# Patient Record
Sex: FEMALE | Race: WHITE | NOT HISPANIC OR LATINO | Employment: FULL TIME | URBAN - METROPOLITAN AREA
[De-identification: names, ages, dates, MRNs, and addresses within clinical notes are randomized per-mention and may not be internally consistent; named-entity substitution may affect disease eponyms.]

---

## 2023-04-03 DIAGNOSIS — N94.6 DYSMENORRHEA: ICD-10-CM

## 2023-04-03 DIAGNOSIS — F41.9 ANXIETY: Primary | ICD-10-CM

## 2023-04-03 RX ORDER — ESCITALOPRAM OXALATE 10 MG/1
10 TABLET ORAL DAILY
Qty: 90 TABLET | Refills: 1 | Status: SHIPPED | OUTPATIENT
Start: 2023-04-03 | End: 2023-04-19 | Stop reason: SDUPTHER

## 2023-04-03 RX ORDER — DROSPIRENONE AND ETHINYL ESTRADIOL 0.02-3(28)
1 KIT ORAL DAILY
COMMUNITY
End: 2023-04-03 | Stop reason: SDUPTHER

## 2023-04-03 RX ORDER — ESCITALOPRAM OXALATE 10 MG/1
10 TABLET ORAL DAILY
COMMUNITY
End: 2023-04-03 | Stop reason: SDUPTHER

## 2023-04-03 RX ORDER — DROSPIRENONE AND ETHINYL ESTRADIOL 0.02-3(28)
1 KIT ORAL DAILY
Qty: 84 TABLET | Refills: 1 | Status: SHIPPED | OUTPATIENT
Start: 2023-04-03 | End: 2023-09-08 | Stop reason: SDUPTHER

## 2023-04-19 ENCOUNTER — OFFICE VISIT (OUTPATIENT)
Dept: PRIMARY CARE | Facility: CLINIC | Age: 33
End: 2023-04-19
Payer: COMMERCIAL

## 2023-04-19 VITALS
TEMPERATURE: 97.8 F | RESPIRATION RATE: 16 BRPM | HEART RATE: 78 BPM | DIASTOLIC BLOOD PRESSURE: 72 MMHG | WEIGHT: 235 LBS | OXYGEN SATURATION: 97 % | BODY MASS INDEX: 41.63 KG/M2 | SYSTOLIC BLOOD PRESSURE: 116 MMHG

## 2023-04-19 DIAGNOSIS — R73.03 PRE-DIABETES: ICD-10-CM

## 2023-04-19 DIAGNOSIS — G47.00 INSOMNIA, UNSPECIFIED TYPE: ICD-10-CM

## 2023-04-19 DIAGNOSIS — E03.9 HYPOTHYROIDISM, UNSPECIFIED TYPE: Primary | ICD-10-CM

## 2023-04-19 DIAGNOSIS — F41.9 ANXIETY: ICD-10-CM

## 2023-04-19 DIAGNOSIS — E28.2 PCOS (POLYCYSTIC OVARIAN SYNDROME): ICD-10-CM

## 2023-04-19 PROBLEM — R56.9 OBSERVED SEIZURE-LIKE ACTIVITY (MULTI): Status: ACTIVE | Noted: 2023-04-19

## 2023-04-19 PROCEDURE — 99214 OFFICE O/P EST MOD 30 MIN: CPT | Performed by: FAMILY MEDICINE

## 2023-04-19 PROCEDURE — 1036F TOBACCO NON-USER: CPT | Performed by: FAMILY MEDICINE

## 2023-04-19 RX ORDER — TRAZODONE HYDROCHLORIDE 50 MG/1
50 TABLET ORAL NIGHTLY PRN
Qty: 30 TABLET | Refills: 3 | Status: SHIPPED | OUTPATIENT
Start: 2023-04-19 | End: 2023-11-13 | Stop reason: SDUPTHER

## 2023-04-19 RX ORDER — METFORMIN HYDROCHLORIDE 500 MG/1
1 TABLET ORAL DAILY
COMMUNITY
Start: 2022-02-01 | End: 2023-09-08 | Stop reason: SDUPTHER

## 2023-04-19 RX ORDER — ESCITALOPRAM OXALATE 10 MG/1
10 TABLET ORAL DAILY
Qty: 90 TABLET | Refills: 1 | Status: SHIPPED | OUTPATIENT
Start: 2023-04-19 | End: 2023-07-12 | Stop reason: SDUPTHER

## 2023-04-19 RX ORDER — LEVOTHYROXINE SODIUM 50 UG/1
1 TABLET ORAL DAILY
COMMUNITY
Start: 2022-12-04 | End: 2023-04-25 | Stop reason: SDUPTHER

## 2023-04-19 ASSESSMENT — ENCOUNTER SYMPTOMS
CONSTIPATION: 0
WEIGHT GAIN: 0
WEIGHT LOSS: 0
NERVOUS/ANXIOUS: 1
FATIGUE: 1

## 2023-04-19 NOTE — PATIENT INSTRUCTIONS
Today we followed up on your Thyroid medication - we will check your labs and adjust the medication accordingly.  Our goal will be to have a TSH between 2 and 3.  We will closely monitor your symptoms to determine the optimal dosing.     We also addressed your insomnia and we will add trazadone to your treatment and continue lexapro for anxiety    For your pre diabetes continue your meeting with dietician and I have rechecked your a1c. Continue metformin.

## 2023-04-24 ENCOUNTER — LAB (OUTPATIENT)
Dept: LAB | Facility: LAB | Age: 33
End: 2023-04-24
Payer: COMMERCIAL

## 2023-04-24 DIAGNOSIS — R73.03 PRE-DIABETES: ICD-10-CM

## 2023-04-24 DIAGNOSIS — E03.9 HYPOTHYROIDISM, UNSPECIFIED TYPE: ICD-10-CM

## 2023-04-24 LAB
ESTIMATED AVERAGE GLUCOSE FOR HBA1C: 126 MG/DL
HEMOGLOBIN A1C/HEMOGLOBIN TOTAL IN BLOOD: 6 %
THYROTROPIN (MIU/L) IN SER/PLAS BY DETECTION LIMIT <= 0.05 MIU/L: 3.64 MIU/L (ref 0.44–3.98)
THYROXINE (T4) FREE (NG/DL) IN SER/PLAS: 0.83 NG/DL (ref 0.61–1.12)

## 2023-04-24 PROCEDURE — 84443 ASSAY THYROID STIM HORMONE: CPT

## 2023-04-24 PROCEDURE — 36415 COLL VENOUS BLD VENIPUNCTURE: CPT

## 2023-04-24 PROCEDURE — 83036 HEMOGLOBIN GLYCOSYLATED A1C: CPT

## 2023-04-24 PROCEDURE — 84439 ASSAY OF FREE THYROXINE: CPT

## 2023-04-25 DIAGNOSIS — E03.9 HYPOTHYROIDISM, UNSPECIFIED TYPE: Primary | ICD-10-CM

## 2023-04-25 RX ORDER — LEVOTHYROXINE SODIUM 50 UG/1
50 TABLET ORAL DAILY
Qty: 30 TABLET | Refills: 2 | Status: SHIPPED | OUTPATIENT
Start: 2023-04-25 | End: 2023-08-04

## 2023-07-12 ENCOUNTER — OFFICE VISIT (OUTPATIENT)
Dept: PRIMARY CARE | Facility: CLINIC | Age: 33
End: 2023-07-12
Payer: COMMERCIAL

## 2023-07-12 VITALS
WEIGHT: 241 LBS | OXYGEN SATURATION: 98 % | RESPIRATION RATE: 16 BRPM | TEMPERATURE: 97.7 F | SYSTOLIC BLOOD PRESSURE: 122 MMHG | HEART RATE: 102 BPM | BODY MASS INDEX: 42.7 KG/M2 | HEIGHT: 63 IN | DIASTOLIC BLOOD PRESSURE: 82 MMHG

## 2023-07-12 DIAGNOSIS — E03.9 HYPOTHYROIDISM, UNSPECIFIED TYPE: ICD-10-CM

## 2023-07-12 DIAGNOSIS — R73.03 PRE-DIABETES: Primary | ICD-10-CM

## 2023-07-12 DIAGNOSIS — F41.9 ANXIETY: ICD-10-CM

## 2023-07-12 DIAGNOSIS — Z23 NEED FOR VACCINATION: ICD-10-CM

## 2023-07-12 DIAGNOSIS — Z00.00 HEALTHCARE MAINTENANCE: ICD-10-CM

## 2023-07-12 DIAGNOSIS — R59.0 LYMPHADENOPATHY, CERVICAL: ICD-10-CM

## 2023-07-12 DIAGNOSIS — F41.9 ANXIETY DISORDER, UNSPECIFIED TYPE: ICD-10-CM

## 2023-07-12 PROBLEM — E28.2 PCOS (POLYCYSTIC OVARIAN SYNDROME): Status: ACTIVE | Noted: 2023-07-12

## 2023-07-12 LAB
POC APPEARANCE, URINE: CLEAR
POC BILIRUBIN, URINE: NEGATIVE
POC BLOOD, URINE: ABNORMAL
POC COLOR, URINE: YELLOW
POC GLUCOSE, URINE: NEGATIVE MG/DL
POC KETONES, URINE: NEGATIVE MG/DL
POC LEUKOCYTES, URINE: NEGATIVE
POC NITRITE,URINE: NEGATIVE
POC PH, URINE: 6 PH
POC PROTEIN, URINE: NEGATIVE MG/DL
POC SPECIFIC GRAVITY, URINE: 1.02
POC UROBILINOGEN, URINE: 0.2 EU/DL

## 2023-07-12 PROCEDURE — 99213 OFFICE O/P EST LOW 20 MIN: CPT | Performed by: FAMILY MEDICINE

## 2023-07-12 PROCEDURE — 1036F TOBACCO NON-USER: CPT | Performed by: FAMILY MEDICINE

## 2023-07-12 PROCEDURE — 81002 URINALYSIS NONAUTO W/O SCOPE: CPT | Performed by: FAMILY MEDICINE

## 2023-07-12 PROCEDURE — 99395 PREV VISIT EST AGE 18-39: CPT | Performed by: FAMILY MEDICINE

## 2023-07-12 PROCEDURE — 82043 UR ALBUMIN QUANTITATIVE: CPT

## 2023-07-12 PROCEDURE — 82570 ASSAY OF URINE CREATININE: CPT

## 2023-07-12 RX ORDER — ESCITALOPRAM OXALATE 10 MG/1
10 TABLET ORAL DAILY
Qty: 90 TABLET | Refills: 1 | Status: SHIPPED | OUTPATIENT
Start: 2023-07-12 | End: 2024-03-13 | Stop reason: SDUPTHER

## 2023-07-12 NOTE — PATIENT INSTRUCTIONS
Today we performed your Annual Physical Exam.  Your preventative health care, labs and vaccinations have been reviewed and are up to date.      Please get your Tdap at the pharmacy    If the lymph node in your neck doesn't resolve please let me know in a few weeks I can order an ultrasound    Follow up in 4 months for medication check    Follow up in 1 year for your Complete Physical Exam
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
## 2023-07-13 LAB
ALBUMIN (MG/L) IN URINE: 17.4 MG/L
ALBUMIN/CREATININE (UG/MG) IN URINE: 12.5 UG/MG CRT (ref 0–30)
CREATININE (MG/DL) IN URINE: 139 MG/DL (ref 20–320)

## 2023-08-04 ENCOUNTER — LAB (OUTPATIENT)
Dept: LAB | Facility: LAB | Age: 33
End: 2023-08-04
Payer: COMMERCIAL

## 2023-08-04 DIAGNOSIS — R79.89 ELEVATED LFTS: Primary | ICD-10-CM

## 2023-08-04 DIAGNOSIS — E03.9 HYPOTHYROIDISM, UNSPECIFIED TYPE: ICD-10-CM

## 2023-08-04 DIAGNOSIS — Z00.00 HEALTHCARE MAINTENANCE: ICD-10-CM

## 2023-08-04 DIAGNOSIS — R73.03 PRE-DIABETES: ICD-10-CM

## 2023-08-04 LAB
ALANINE AMINOTRANSFERASE (SGPT) (U/L) IN SER/PLAS: 82 U/L (ref 7–45)
ALBUMIN (G/DL) IN SER/PLAS: 4 G/DL (ref 3.4–5)
ALKALINE PHOSPHATASE (U/L) IN SER/PLAS: 38 U/L (ref 33–110)
ANION GAP IN SER/PLAS: 16 MMOL/L (ref 10–20)
ASPARTATE AMINOTRANSFERASE (SGOT) (U/L) IN SER/PLAS: 249 U/L (ref 9–39)
BILIRUBIN TOTAL (MG/DL) IN SER/PLAS: 0.4 MG/DL (ref 0–1.2)
CALCIDIOL (25 OH VITAMIN D3) (NG/ML) IN SER/PLAS: 32 NG/ML
CALCIUM (MG/DL) IN SER/PLAS: 9 MG/DL (ref 8.6–10.3)
CARBON DIOXIDE, TOTAL (MMOL/L) IN SER/PLAS: 23 MMOL/L (ref 21–32)
CHLORIDE (MMOL/L) IN SER/PLAS: 102 MMOL/L (ref 98–107)
CHOLESTEROL (MG/DL) IN SER/PLAS: 164 MG/DL (ref 0–199)
CHOLESTEROL IN HDL (MG/DL) IN SER/PLAS: 54.5 MG/DL
CHOLESTEROL/HDL RATIO: 3
CREATININE (MG/DL) IN SER/PLAS: 0.97 MG/DL (ref 0.5–1.05)
ERYTHROCYTE DISTRIBUTION WIDTH (RATIO) BY AUTOMATED COUNT: 12.6 % (ref 11.5–14.5)
ERYTHROCYTE MEAN CORPUSCULAR HEMOGLOBIN CONCENTRATION (G/DL) BY AUTOMATED: 33.3 G/DL (ref 32–36)
ERYTHROCYTE MEAN CORPUSCULAR VOLUME (FL) BY AUTOMATED COUNT: 95 FL (ref 80–100)
ERYTHROCYTES (10*6/UL) IN BLOOD BY AUTOMATED COUNT: 4.23 X10E12/L (ref 4–5.2)
ESTIMATED AVERAGE GLUCOSE FOR HBA1C: 126 MG/DL
GFR FEMALE: 80 ML/MIN/1.73M2
GLUCOSE (MG/DL) IN SER/PLAS: 107 MG/DL (ref 74–99)
HEMATOCRIT (%) IN BLOOD BY AUTOMATED COUNT: 40.3 % (ref 36–46)
HEMOGLOBIN (G/DL) IN BLOOD: 13.4 G/DL (ref 12–16)
HEMOGLOBIN A1C/HEMOGLOBIN TOTAL IN BLOOD: 6 %
LDL: 53 MG/DL (ref 0–99)
LEUKOCYTES (10*3/UL) IN BLOOD BY AUTOMATED COUNT: 7.6 X10E9/L (ref 4.4–11.3)
NON HDL CHOLESTEROL: 110 MG/DL
NRBC (PER 100 WBCS) BY AUTOMATED COUNT: 0 /100 WBC (ref 0–0)
PLATELETS (10*3/UL) IN BLOOD AUTOMATED COUNT: 397 X10E9/L (ref 150–450)
POTASSIUM (MMOL/L) IN SER/PLAS: 4.6 MMOL/L (ref 3.5–5.3)
PROTEIN TOTAL: 7 G/DL (ref 6.4–8.2)
SODIUM (MMOL/L) IN SER/PLAS: 136 MMOL/L (ref 136–145)
THYROTROPIN (MIU/L) IN SER/PLAS BY DETECTION LIMIT <= 0.05 MIU/L: 6.09 MIU/L (ref 0.44–3.98)
THYROXINE (T4) FREE (NG/DL) IN SER/PLAS: 0.69 NG/DL (ref 0.61–1.12)
TRIGLYCERIDE (MG/DL) IN SER/PLAS: 285 MG/DL (ref 0–149)
UREA NITROGEN (MG/DL) IN SER/PLAS: 15 MG/DL (ref 6–23)
VLDL: 57 MG/DL (ref 0–40)

## 2023-08-04 PROCEDURE — 85027 COMPLETE CBC AUTOMATED: CPT

## 2023-08-04 PROCEDURE — 82306 VITAMIN D 25 HYDROXY: CPT

## 2023-08-04 PROCEDURE — 80053 COMPREHEN METABOLIC PANEL: CPT

## 2023-08-04 PROCEDURE — 84443 ASSAY THYROID STIM HORMONE: CPT

## 2023-08-04 PROCEDURE — 84439 ASSAY OF FREE THYROXINE: CPT

## 2023-08-04 PROCEDURE — 36415 COLL VENOUS BLD VENIPUNCTURE: CPT

## 2023-08-04 PROCEDURE — 80061 LIPID PANEL: CPT

## 2023-08-04 PROCEDURE — 83036 HEMOGLOBIN GLYCOSYLATED A1C: CPT

## 2023-08-04 RX ORDER — LEVOTHYROXINE SODIUM 75 UG/1
75 TABLET ORAL DAILY
Qty: 30 TABLET | Refills: 2 | Status: SHIPPED | OUTPATIENT
Start: 2023-08-04 | End: 2023-11-13 | Stop reason: SDUPTHER

## 2023-08-25 PROBLEM — R79.89 LOW VITAMIN B12 LEVEL: Status: ACTIVE | Noted: 2023-08-25

## 2023-08-25 PROBLEM — E03.9 HYPOTHYROIDISM: Status: ACTIVE | Noted: 2023-02-20

## 2023-08-25 PROBLEM — Z04.9 CONDITION NOT FOUND: Status: ACTIVE | Noted: 2023-08-25

## 2023-08-25 PROBLEM — K21.9 GERD (GASTROESOPHAGEAL REFLUX DISEASE): Status: ACTIVE | Noted: 2021-10-20

## 2023-08-25 PROBLEM — F43.10 PTSD (POST-TRAUMATIC STRESS DISORDER): Status: ACTIVE | Noted: 2023-08-25

## 2023-08-25 PROBLEM — R10.2 CHRONIC PAIN IN FEMALE PELVIS: Status: ACTIVE | Noted: 2023-08-25

## 2023-08-25 PROBLEM — R63.5 WEIGHT GAIN: Status: ACTIVE | Noted: 2023-08-25

## 2023-08-25 PROBLEM — F41.1 GENERALIZED ANXIETY DISORDER: Status: ACTIVE | Noted: 2023-07-12

## 2023-08-25 PROBLEM — R62.50 DEVELOPMENTAL CONCERN: Status: ACTIVE | Noted: 2023-08-25

## 2023-08-25 PROBLEM — R10.84 GENERALIZED ABDOMINAL PAIN: Status: ACTIVE | Noted: 2023-08-25

## 2023-08-25 PROBLEM — E88.819 INSULIN RESISTANCE: Status: ACTIVE | Noted: 2023-08-25

## 2023-08-25 PROBLEM — N94.6 DYSMENORRHEA: Status: ACTIVE | Noted: 2023-08-25

## 2023-08-25 PROBLEM — G89.29 CHRONIC PAIN IN FEMALE PELVIS: Status: ACTIVE | Noted: 2023-08-25

## 2023-08-25 PROBLEM — E56.9 VITAMIN DEFICIENCY: Status: ACTIVE | Noted: 2023-02-20

## 2023-08-25 PROBLEM — R73.9 HYPERGLYCEMIA: Status: ACTIVE | Noted: 2023-08-25

## 2023-08-25 PROBLEM — N60.19 FIBROCYSTIC BREAST CHANGES: Status: ACTIVE | Noted: 2023-08-25

## 2023-08-25 PROBLEM — F33.9 EPISODE OF RECURRENT MAJOR DEPRESSIVE DISORDER (CMS-HCC): Chronic | Status: ACTIVE | Noted: 2023-02-20

## 2023-08-25 PROBLEM — K58.9 IBS (IRRITABLE BOWEL SYNDROME): Status: ACTIVE | Noted: 2021-10-20

## 2023-08-25 PROBLEM — F84.0 AUTISM (HHS-HCC): Status: ACTIVE | Noted: 2022-03-13

## 2023-08-25 PROBLEM — R79.89 ELEVATED TSH: Status: ACTIVE | Noted: 2023-08-25

## 2023-08-25 PROBLEM — E66.01 MORBID (SEVERE) OBESITY DUE TO EXCESS CALORIES (MULTI): Status: ACTIVE | Noted: 2023-02-20

## 2023-08-25 PROBLEM — G40.909 SEIZURE DISORDER (MULTI): Status: ACTIVE | Noted: 2023-08-25

## 2023-08-25 PROBLEM — K59.09 CHRONIC CONSTIPATION: Status: ACTIVE | Noted: 2023-08-25

## 2023-08-25 PROBLEM — R79.89 ELEVATED LFTS: Status: ACTIVE | Noted: 2023-08-25

## 2023-08-25 PROBLEM — R74.8 ABNORMAL CREATINE KINASE LEVEL: Status: ACTIVE | Noted: 2023-08-25

## 2023-08-25 PROBLEM — R00.0 SINUS TACHYCARDIA: Status: ACTIVE | Noted: 2023-08-25

## 2023-08-25 PROBLEM — R12 HEARTBURN: Status: ACTIVE | Noted: 2023-08-25

## 2023-08-25 PROBLEM — R73.03 PREDIABETES: Status: ACTIVE | Noted: 2023-02-20

## 2023-08-25 PROBLEM — F50.00 ANOREXIA NERVOSA IN REMISSION (MULTI): Status: ACTIVE | Noted: 2023-08-25

## 2023-08-25 PROBLEM — E28.2 PCOS (POLYCYSTIC OVARIAN SYNDROME): Status: ACTIVE | Noted: 2023-02-20

## 2023-08-25 RX ORDER — LEVOTHYROXINE SODIUM 50 UG/1
50 TABLET ORAL DAILY
COMMUNITY
End: 2023-11-13 | Stop reason: ALTCHOICE

## 2023-08-25 RX ORDER — ERGOCALCIFEROL 1.25 MG/1
1.25 CAPSULE ORAL
COMMUNITY
End: 2024-03-13 | Stop reason: ALTCHOICE

## 2023-09-08 DIAGNOSIS — R73.03 PREDIABETES: ICD-10-CM

## 2023-09-08 DIAGNOSIS — N94.6 DYSMENORRHEA: ICD-10-CM

## 2023-09-08 DIAGNOSIS — E28.2 PCOS (POLYCYSTIC OVARIAN SYNDROME): Primary | ICD-10-CM

## 2023-09-08 RX ORDER — DROSPIRENONE AND ETHINYL ESTRADIOL 0.02-3(28)
1 KIT ORAL DAILY
Qty: 84 TABLET | Refills: 0 | Status: SHIPPED | OUTPATIENT
Start: 2023-09-08 | End: 2023-11-13 | Stop reason: SDUPTHER

## 2023-09-08 RX ORDER — METFORMIN HYDROCHLORIDE 500 MG/1
500 TABLET ORAL DAILY
Qty: 90 TABLET | Refills: 0 | Status: SHIPPED | OUTPATIENT
Start: 2023-09-08 | End: 2023-11-13 | Stop reason: SDUPTHER

## 2023-09-08 NOTE — TELEPHONE ENCOUNTER
Refill approved but needs appt for additional refills.  Patient was advised of this 4 months ago.  Please call to schedule

## 2023-10-04 ENCOUNTER — APPOINTMENT (OUTPATIENT)
Dept: NEUROLOGY | Facility: CLINIC | Age: 33
End: 2023-10-04
Payer: COMMERCIAL

## 2023-11-13 ENCOUNTER — OFFICE VISIT (OUTPATIENT)
Dept: PRIMARY CARE | Facility: CLINIC | Age: 33
End: 2023-11-13
Payer: COMMERCIAL

## 2023-11-13 VITALS
TEMPERATURE: 98.1 F | RESPIRATION RATE: 18 BRPM | BODY MASS INDEX: 42.88 KG/M2 | OXYGEN SATURATION: 100 % | HEIGHT: 63 IN | SYSTOLIC BLOOD PRESSURE: 134 MMHG | WEIGHT: 242 LBS | DIASTOLIC BLOOD PRESSURE: 78 MMHG | HEART RATE: 75 BPM

## 2023-11-13 DIAGNOSIS — E03.9 HYPOTHYROIDISM, UNSPECIFIED TYPE: ICD-10-CM

## 2023-11-13 DIAGNOSIS — R73.03 PREDIABETES: ICD-10-CM

## 2023-11-13 DIAGNOSIS — G47.00 INSOMNIA, UNSPECIFIED TYPE: ICD-10-CM

## 2023-11-13 DIAGNOSIS — N94.6 DYSMENORRHEA: ICD-10-CM

## 2023-11-13 DIAGNOSIS — E88.819 INSULIN RESISTANCE: ICD-10-CM

## 2023-11-13 DIAGNOSIS — E28.2 PCOS (POLYCYSTIC OVARIAN SYNDROME): ICD-10-CM

## 2023-11-13 DIAGNOSIS — R73.03 PRE-DIABETES: Primary | ICD-10-CM

## 2023-11-13 LAB — POC HEMOGLOBIN A1C: 5.7 % (ref 4.2–6.5)

## 2023-11-13 PROCEDURE — 1036F TOBACCO NON-USER: CPT | Performed by: FAMILY MEDICINE

## 2023-11-13 PROCEDURE — 83036 HEMOGLOBIN GLYCOSYLATED A1C: CPT | Performed by: FAMILY MEDICINE

## 2023-11-13 PROCEDURE — 99214 OFFICE O/P EST MOD 30 MIN: CPT | Performed by: FAMILY MEDICINE

## 2023-11-13 RX ORDER — DROSPIRENONE AND ETHINYL ESTRADIOL 0.02-3(28)
1 KIT ORAL DAILY
Qty: 84 TABLET | Refills: 3 | Status: SHIPPED | OUTPATIENT
Start: 2023-11-13

## 2023-11-13 RX ORDER — METFORMIN HYDROCHLORIDE 500 MG/1
500 TABLET ORAL DAILY
Qty: 90 TABLET | Refills: 0 | Status: SHIPPED | OUTPATIENT
Start: 2023-11-13 | End: 2024-03-13 | Stop reason: SINTOL

## 2023-11-13 RX ORDER — TRAZODONE HYDROCHLORIDE 50 MG/1
50 TABLET ORAL NIGHTLY PRN
Qty: 90 TABLET | Refills: 1 | Status: SHIPPED | OUTPATIENT
Start: 2023-11-13 | End: 2024-03-13 | Stop reason: SDUPTHER

## 2023-11-13 RX ORDER — LEVOTHYROXINE SODIUM 75 UG/1
75 TABLET ORAL DAILY
Qty: 30 TABLET | Refills: 0 | Status: SHIPPED | OUTPATIENT
Start: 2023-11-13 | End: 2023-11-17 | Stop reason: ALTCHOICE

## 2023-11-13 NOTE — PROGRESS NOTES
"Subjective   Patient ID: Levi Ramírez is a 33 y.o. female who presents for Diabetes.    She has met with a dietician every few months.  She is on metformin.  The nutritionist is leaving her position.  She does what she can between her work schedule and expensive groceries.  She tries to eat veggies every day and tries to keep hydrated.  She walks a lot for work and averages between 3K and 4K steps a day.  She was working out regularly for awhile and lost only 10 pounds.    She is still on her thyroid medication but she can't do labs today because she just took it.  She lives in [facility] and needs to do her labs there.     She hasn't had any more seizure like activity and thinks that she had night terrors.      Diabetes         Review of Systems    Objective   /78   Pulse 75   Temp 36.7 °C (98.1 °F)   Resp 18   Ht 1.6 m (5' 3\")   Wt 110 kg (242 lb)   SpO2 100%   BMI 42.87 kg/m²     Physical Exam  Vitals reviewed.   Constitutional:       Appearance: Normal appearance.   HENT:      Head: Normocephalic and atraumatic.      Right Ear: Tympanic membrane normal.      Left Ear: Tympanic membrane normal.      Mouth/Throat:      Mouth: Mucous membranes are moist.   Eyes:      Pupils: Pupils are equal, round, and reactive to light.   Cardiovascular:      Rate and Rhythm: Normal rate and regular rhythm.      Pulses:           Dorsalis pedis pulses are 2+ on the right side and 2+ on the left side.      Heart sounds: Normal heart sounds.   Pulmonary:      Effort: Pulmonary effort is normal.      Breath sounds: Normal breath sounds.   Musculoskeletal:      Cervical back: Normal range of motion.   Feet:      Right foot:      Skin integrity: Skin integrity normal.      Toenail Condition: Right toenails are normal.      Left foot:      Skin integrity: Skin integrity normal.      Toenail Condition: Left toenails are normal.   Skin:     General: Skin is dry.   Neurological:      General: No focal deficit present.      Mental " Status: She is alert.         Assessment/Plan   Diagnoses and all orders for this visit:  Pre-diabetes  -     POCT glycosylated hemoglobin (Hb A1C) manually resulted  Prediabetes  -     metFORMIN (Glucophage) 500 mg tablet; Take 1 tablet (500 mg) by mouth once daily.  Insulin resistance  Dysmenorrhea  -     drospirenone-ethinyl estradioL (Michelle, Gianvi) 3-0.02 mg tablet; Take 1 tablet by mouth once daily.  Hypothyroidism, unspecified type  -     Thyroid Stimulating Hormone; Future  -     Thyroxine, Free; Future  -     levothyroxine (Synthroid, Levoxyl) 75 mcg tablet; Take 1 tablet (75 mcg) by mouth once daily.  Insomnia, unspecified type  -     traZODone (Desyrel) 50 mg tablet; Take 1 tablet (50 mg) by mouth as needed at bedtime for sleep (start 1/2 pill at bedtime and may increase to 1 pill at bedtime if needed.).  PCOS (polycystic ovarian syndrome)  -     metFORMIN (Glucophage) 500 mg tablet; Take 1 tablet (500 mg) by mouth once daily.

## 2023-11-13 NOTE — PATIENT INSTRUCTIONS
Today we followed up on your Diabetes.     You are doing great!  Continue 5 servings of fresh fruits and veggies daily.  8 glasses of water daily.  30 minutes of exercise daily.  Continue your current medications.     Follow up in 4 months for your medication check     Because you are having difficulty losing weight and have insulin resistance you may qualify for GLP-1 so you will check with your insurance to see what is covered and let me know.      Today we followed up on your Thyroid medication - we will check your labs and adjust the medication accordingly.  Our goal will be to have a TSH between 2 and 3.  We will closely monitor your symptoms to determine the optimal dosing.     Your sleep is doing well with trazadone and so we refille this today as well.

## 2023-11-16 ENCOUNTER — LAB (OUTPATIENT)
Dept: LAB | Facility: LAB | Age: 33
End: 2023-11-16
Payer: COMMERCIAL

## 2023-11-16 DIAGNOSIS — E03.9 HYPOTHYROIDISM, UNSPECIFIED TYPE: ICD-10-CM

## 2023-11-16 DIAGNOSIS — R79.89 ELEVATED LFTS: ICD-10-CM

## 2023-11-16 LAB
ALBUMIN SERPL BCP-MCNC: 3.7 G/DL (ref 3.4–5)
ALP SERPL-CCNC: 43 U/L (ref 33–110)
ALT SERPL W P-5'-P-CCNC: 42 U/L (ref 7–45)
AST SERPL W P-5'-P-CCNC: 43 U/L (ref 9–39)
BILIRUB DIRECT SERPL-MCNC: 0.1 MG/DL (ref 0–0.3)
BILIRUB SERPL-MCNC: 0.3 MG/DL (ref 0–1.2)
EBV EA IGG SER QL: POSITIVE
EBV NA AB SER QL: POSITIVE
EBV VCA IGG SER IA-ACNC: POSITIVE
EBV VCA IGM SER IA-ACNC: ABNORMAL
GGT SERPL-CCNC: 71 U/L (ref 5–55)
HAV IGM SER QL: NONREACTIVE
HBV CORE IGM SER QL: NONREACTIVE
HBV SURFACE AG SERPL QL IA: NONREACTIVE
HCV AB SER QL: NONREACTIVE
PROT SERPL-MCNC: 6.7 G/DL (ref 6.4–8.2)
T4 FREE SERPL-MCNC: 0.78 NG/DL (ref 0.61–1.12)
TSH SERPL-ACNC: 5.75 MIU/L (ref 0.44–3.98)

## 2023-11-16 PROCEDURE — 36415 COLL VENOUS BLD VENIPUNCTURE: CPT

## 2023-11-16 PROCEDURE — 86663 EPSTEIN-BARR ANTIBODY: CPT

## 2023-11-16 PROCEDURE — 82977 ASSAY OF GGT: CPT

## 2023-11-16 PROCEDURE — 84443 ASSAY THYROID STIM HORMONE: CPT

## 2023-11-16 PROCEDURE — 86664 EPSTEIN-BARR NUCLEAR ANTIGEN: CPT

## 2023-11-16 PROCEDURE — 84439 ASSAY OF FREE THYROXINE: CPT

## 2023-11-16 PROCEDURE — 89240 UNLISTED MISC PATH TEST: CPT | Performed by: FAMILY MEDICINE

## 2023-11-16 PROCEDURE — 86665 EPSTEIN-BARR CAPSID VCA: CPT

## 2023-11-16 PROCEDURE — 80074 ACUTE HEPATITIS PANEL: CPT

## 2023-11-16 PROCEDURE — 80076 HEPATIC FUNCTION PANEL: CPT

## 2023-11-17 DIAGNOSIS — R79.89 ELEVATED LFTS: Primary | ICD-10-CM

## 2023-11-17 DIAGNOSIS — E03.9 HYPOTHYROIDISM, UNSPECIFIED TYPE: Primary | ICD-10-CM

## 2023-11-17 RX ORDER — LEVOTHYROXINE SODIUM 88 UG/1
88 TABLET ORAL DAILY
Qty: 30 TABLET | Refills: 3 | Status: SHIPPED | OUTPATIENT
Start: 2023-11-17 | End: 2024-03-13 | Stop reason: DRUGHIGH

## 2023-11-20 LAB — SCAN RESULT: NORMAL

## 2024-01-08 ENCOUNTER — APPOINTMENT (OUTPATIENT)
Dept: NUTRITION | Facility: HOSPITAL | Age: 34
End: 2024-01-08
Payer: COMMERCIAL

## 2024-03-13 ENCOUNTER — TELEPHONE (OUTPATIENT)
Dept: PRIMARY CARE | Facility: CLINIC | Age: 34
End: 2024-03-13

## 2024-03-13 ENCOUNTER — LAB (OUTPATIENT)
Dept: LAB | Facility: LAB | Age: 34
End: 2024-03-13
Payer: COMMERCIAL

## 2024-03-13 ENCOUNTER — OFFICE VISIT (OUTPATIENT)
Dept: PRIMARY CARE | Facility: CLINIC | Age: 34
End: 2024-03-13
Payer: COMMERCIAL

## 2024-03-13 VITALS
OXYGEN SATURATION: 98 % | WEIGHT: 247 LBS | TEMPERATURE: 97.8 F | HEART RATE: 101 BPM | DIASTOLIC BLOOD PRESSURE: 86 MMHG | SYSTOLIC BLOOD PRESSURE: 126 MMHG | BODY MASS INDEX: 43.75 KG/M2 | RESPIRATION RATE: 18 BRPM

## 2024-03-13 DIAGNOSIS — R79.89 ELEVATED LFTS: ICD-10-CM

## 2024-03-13 DIAGNOSIS — G47.00 INSOMNIA, UNSPECIFIED TYPE: ICD-10-CM

## 2024-03-13 DIAGNOSIS — E03.9 HYPOTHYROIDISM, UNSPECIFIED TYPE: ICD-10-CM

## 2024-03-13 DIAGNOSIS — E03.9 HYPOTHYROIDISM, UNSPECIFIED TYPE: Primary | ICD-10-CM

## 2024-03-13 DIAGNOSIS — F50.00 ANOREXIA NERVOSA IN REMISSION (MULTI): ICD-10-CM

## 2024-03-13 DIAGNOSIS — K59.09 CHRONIC CONSTIPATION: ICD-10-CM

## 2024-03-13 DIAGNOSIS — F41.9 ANXIETY: ICD-10-CM

## 2024-03-13 DIAGNOSIS — E28.2 PCOS (POLYCYSTIC OVARIAN SYNDROME): ICD-10-CM

## 2024-03-13 DIAGNOSIS — E66.01 MORBID (SEVERE) OBESITY DUE TO EXCESS CALORIES (MULTI): ICD-10-CM

## 2024-03-13 DIAGNOSIS — R73.03 PRE-DIABETES: ICD-10-CM

## 2024-03-13 DIAGNOSIS — E88.819 INSULIN RESISTANCE: ICD-10-CM

## 2024-03-13 PROBLEM — G40.909 SEIZURE DISORDER (MULTI): Status: RESOLVED | Noted: 2023-08-25 | Resolved: 2024-03-13

## 2024-03-13 PROBLEM — F84.0 AUTISM (HHS-HCC): Status: RESOLVED | Noted: 2022-03-13 | Resolved: 2024-03-13

## 2024-03-13 PROBLEM — R56.9 OBSERVED SEIZURE-LIKE ACTIVITY (MULTI): Status: RESOLVED | Noted: 2023-04-19 | Resolved: 2024-03-13

## 2024-03-13 LAB
ALBUMIN SERPL BCP-MCNC: 3.9 G/DL (ref 3.4–5)
ALP SERPL-CCNC: 42 U/L (ref 33–110)
ALT SERPL W P-5'-P-CCNC: 60 U/L (ref 7–45)
ANION GAP SERPL CALC-SCNC: 14 MMOL/L (ref 10–20)
AST SERPL W P-5'-P-CCNC: 79 U/L (ref 9–39)
BILIRUB DIRECT SERPL-MCNC: 0.1 MG/DL (ref 0–0.3)
BILIRUB SERPL-MCNC: 0.4 MG/DL (ref 0–1.2)
BUN SERPL-MCNC: 13 MG/DL (ref 6–23)
CALCIUM SERPL-MCNC: 8.9 MG/DL (ref 8.6–10.3)
CHLORIDE SERPL-SCNC: 103 MMOL/L (ref 98–107)
CO2 SERPL-SCNC: 26 MMOL/L (ref 21–32)
CREAT SERPL-MCNC: 0.98 MG/DL (ref 0.5–1.05)
EGFRCR SERPLBLD CKD-EPI 2021: 79 ML/MIN/1.73M*2
GLUCOSE SERPL-MCNC: 112 MG/DL (ref 74–99)
POC HEMOGLOBIN A1C: 6 % (ref 4.2–6.5)
POTASSIUM SERPL-SCNC: 4.3 MMOL/L (ref 3.5–5.3)
PROT SERPL-MCNC: 7.1 G/DL (ref 6.4–8.2)
SODIUM SERPL-SCNC: 139 MMOL/L (ref 136–145)
T4 FREE SERPL-MCNC: 0.92 NG/DL (ref 0.61–1.12)
TSH SERPL-ACNC: 4.51 MIU/L (ref 0.44–3.98)

## 2024-03-13 PROCEDURE — 1036F TOBACCO NON-USER: CPT | Performed by: FAMILY MEDICINE

## 2024-03-13 PROCEDURE — 99214 OFFICE O/P EST MOD 30 MIN: CPT | Performed by: FAMILY MEDICINE

## 2024-03-13 PROCEDURE — 82248 BILIRUBIN DIRECT: CPT

## 2024-03-13 PROCEDURE — 36415 COLL VENOUS BLD VENIPUNCTURE: CPT

## 2024-03-13 PROCEDURE — 84443 ASSAY THYROID STIM HORMONE: CPT

## 2024-03-13 PROCEDURE — 83036 HEMOGLOBIN GLYCOSYLATED A1C: CPT | Performed by: FAMILY MEDICINE

## 2024-03-13 PROCEDURE — 80053 COMPREHEN METABOLIC PANEL: CPT

## 2024-03-13 PROCEDURE — 84439 ASSAY OF FREE THYROXINE: CPT

## 2024-03-13 RX ORDER — TIRZEPATIDE 2.5 MG/.5ML
2.5 INJECTION, SOLUTION SUBCUTANEOUS
Qty: 0.5 ML | Refills: 3 | Status: SHIPPED | OUTPATIENT
Start: 2024-03-13 | End: 2024-03-20 | Stop reason: SDUPTHER

## 2024-03-13 RX ORDER — TRAZODONE HYDROCHLORIDE 50 MG/1
50 TABLET ORAL NIGHTLY PRN
Qty: 90 TABLET | Refills: 1 | Status: SHIPPED | OUTPATIENT
Start: 2024-03-13

## 2024-03-13 RX ORDER — LEVOTHYROXINE SODIUM 100 UG/1
100 TABLET ORAL DAILY
Qty: 30 TABLET | Refills: 3 | Status: SHIPPED | OUTPATIENT
Start: 2024-03-13 | End: 2025-03-13

## 2024-03-13 RX ORDER — ESCITALOPRAM OXALATE 10 MG/1
10 TABLET ORAL DAILY
Qty: 90 TABLET | Refills: 1 | Status: SHIPPED | OUTPATIENT
Start: 2024-03-13

## 2024-03-13 NOTE — PATIENT INSTRUCTIONS
Today we followed up on your pre diabetes which is getting worse since you cannot take metformin due to the size of it.  We will try a GLP-1 instead.     I have refiled your trazadone for sleep and lexapro for anxiety.      Today we followed up on your Thyroid medication - we will check your labs and adjust the medication accordingly.  Our goal will be to have a TSH between 2 and 3.  We will closely monitor your symptoms to determine the optimal dosing.     You had elevated LFT's and recheck mono last visit so we will recheck the LFT's again.

## 2024-03-13 NOTE — PROGRESS NOTES
Subjective   Patient ID: Levi Ramírez is a 33 y.o. female who presents for Prediabetes and Hypothyroidism.    She has been doing well on the thyroid and she is good about taking the medication.   Energy is good, constipation she has often because of her IBS    She takes trazadone at bedtime which works well.      It is difficult for her to take metformin.  She skips it sometimes because she doesn't want to deal with the gag relfex.    She has no history of thyroid cancers and no family history of MEN.         Review of Systems    Objective   BP (!) 122/92   Pulse 101   Temp 36.6 °C (97.8 °F)   Resp 18   Wt 112 kg (247 lb)   SpO2 98%   BMI 43.75 kg/m²     Physical Exam  Constitutional:       Appearance: Normal appearance.   HENT:      Head: Normocephalic and atraumatic.   Neck:      Thyroid: No thyroid mass, thyromegaly or thyroid tenderness.   Cardiovascular:      Rate and Rhythm: Normal rate and regular rhythm.   Pulmonary:      Effort: Pulmonary effort is normal.      Breath sounds: Normal breath sounds.   Musculoskeletal:      Cervical back: Normal range of motion and neck supple.   Skin:     General: Skin is warm and dry.   Neurological:      Mental Status: She is alert.         Assessment/Plan   Diagnoses and all orders for this visit:  Hypothyroidism, unspecified type  -     Thyroid Stimulating Hormone; Future  -     Thyroxine, Free; Future  Pre-diabetes  -     POCT glycosylated hemoglobin (Hb A1C) manually resulted  -     tirzepatide (Mounjaro) 2.5 mg/0.5 mL pen injector; Inject 2.5 mg under the skin 1 (one) time per week.  Anorexia nervosa in remission  Insomnia, unspecified type  -     traZODone (Desyrel) 50 mg tablet; Take 1 tablet (50 mg) by mouth as needed at bedtime for sleep (start 1/2 pill at bedtime and may increase to 1 pill at bedtime if needed.).  Anxiety  -     escitalopram (Lexapro) 10 mg tablet; Take 1 tablet (10 mg) by mouth once daily.  Chronic constipation  PCOS (polycystic ovarian  syndrome)  Morbid (severe) obesity due to excess calories (CMS/HCC)  Insulin resistance  Elevated LFTs  -     Comprehensive Metabolic Panel; Future

## 2024-03-19 ENCOUNTER — HOSPITAL ENCOUNTER (OUTPATIENT)
Dept: RADIOLOGY | Facility: HOSPITAL | Age: 34
Discharge: HOME | End: 2024-03-19
Payer: COMMERCIAL

## 2024-03-19 DIAGNOSIS — R79.89 ELEVATED LFTS: ICD-10-CM

## 2024-03-19 PROCEDURE — 76705 ECHO EXAM OF ABDOMEN: CPT

## 2024-03-19 PROCEDURE — 76705 ECHO EXAM OF ABDOMEN: CPT | Performed by: RADIOLOGY

## 2024-03-20 DIAGNOSIS — E11.9 TYPE 2 DIABETES MELLITUS WITHOUT COMPLICATION, WITHOUT LONG-TERM CURRENT USE OF INSULIN (MULTI): Primary | ICD-10-CM

## 2024-03-20 DIAGNOSIS — K80.20 CALCULUS OF GALLBLADDER WITHOUT CHOLECYSTITIS WITHOUT OBSTRUCTION: Primary | ICD-10-CM

## 2024-03-20 RX ORDER — TIRZEPATIDE 2.5 MG/.5ML
2.5 INJECTION, SOLUTION SUBCUTANEOUS
Qty: 0.5 ML | Refills: 3 | Status: SHIPPED | OUTPATIENT
Start: 2024-03-20 | End: 2024-03-22

## 2024-03-22 DIAGNOSIS — E88.819 INSULIN RESISTANCE: ICD-10-CM

## 2024-03-22 DIAGNOSIS — E66.01 MORBID (SEVERE) OBESITY DUE TO EXCESS CALORIES (MULTI): ICD-10-CM

## 2024-03-22 DIAGNOSIS — E11.9 TYPE 2 DIABETES MELLITUS WITHOUT COMPLICATION, WITHOUT LONG-TERM CURRENT USE OF INSULIN (MULTI): Primary | ICD-10-CM

## 2024-03-22 RX ORDER — DULAGLUTIDE 0.75 MG/.5ML
0.75 INJECTION, SOLUTION SUBCUTANEOUS
Qty: 2 ML | Refills: 3 | Status: SHIPPED | OUTPATIENT
Start: 2024-03-22

## 2024-04-01 ENCOUNTER — OFFICE VISIT (OUTPATIENT)
Dept: SURGERY | Facility: CLINIC | Age: 34
End: 2024-04-01
Payer: COMMERCIAL

## 2024-04-01 VITALS
HEART RATE: 90 BPM | TEMPERATURE: 97.3 F | DIASTOLIC BLOOD PRESSURE: 90 MMHG | RESPIRATION RATE: 16 BRPM | SYSTOLIC BLOOD PRESSURE: 144 MMHG | BODY MASS INDEX: 43.7 KG/M2 | WEIGHT: 246.6 LBS | OXYGEN SATURATION: 99 % | HEIGHT: 63 IN

## 2024-04-01 DIAGNOSIS — K80.20 CALCULUS OF GALLBLADDER WITHOUT CHOLECYSTITIS WITHOUT OBSTRUCTION: Primary | ICD-10-CM

## 2024-04-01 PROCEDURE — 99203 OFFICE O/P NEW LOW 30 MIN: CPT | Performed by: SURGERY

## 2024-04-01 RX ORDER — HEPARIN SODIUM 5000 [USP'U]/ML
5000 INJECTION, SOLUTION INTRAVENOUS; SUBCUTANEOUS ONCE
OUTPATIENT
Start: 2024-04-01 | End: 2024-04-01

## 2024-04-01 RX ORDER — METRONIDAZOLE 500 MG/100ML
500 INJECTION, SOLUTION INTRAVENOUS ONCE
OUTPATIENT
Start: 2024-04-01 | End: 2024-04-01

## 2024-04-01 RX ORDER — SODIUM CHLORIDE, SODIUM LACTATE, POTASSIUM CHLORIDE, CALCIUM CHLORIDE 600; 310; 30; 20 MG/100ML; MG/100ML; MG/100ML; MG/100ML
100 INJECTION, SOLUTION INTRAVENOUS CONTINUOUS
OUTPATIENT
Start: 2024-04-01

## 2024-04-01 RX ORDER — CIPROFLOXACIN 2 MG/ML
400 INJECTION, SOLUTION INTRAVENOUS ONCE
OUTPATIENT
Start: 2024-04-01 | End: 2024-04-01

## 2024-04-01 NOTE — PROGRESS NOTES
"History and Physical        Referring Provider: Dr. An So        Chief Complaint:  Cholelithiasis, biliary colic        History of Present Illness:  This is a 33-year-old female who was incidentally found to have a gallbladder full of stones while having a FibroScan.  Patient states that she normally does not have the usual signs and symptoms however when asked further she has severe GERD, epigastric pain, and back pain.        Past Medical History:  Hypothyroidism, prediabetic, insomnia, anxiety/depression, chronic constipation, PCOS, morbid obesity, history of anorexia nervosa     Past Surgical History:  Tubal ligation and wisdom teeth removal        Medications:  As per medical record        Allergies:  Amoxicillin     Family History:  The information was reviewed and no pertinent findings are relevant to the presenting problem.        Social History:  Patient lives with 3 friends, she is an  at [employer], she vapes nicotine, occasionally drinks EtOH, denies IDU.     Review of Systems  A complete 10 point review of systems was performed and is negative except as noted in the history of present illness.          Physical Exam:   /90 (BP Location: Right arm, Patient Position: Sitting, BP Cuff Size: Large adult)   Pulse 90   Temp 36.3 °C (97.3 °F) (Temporal)   Resp 16   Ht 1.6 m (5' 3\")   Wt 112 kg (246 lb 9.6 oz)   SpO2 99%   BMI 43.68 kg/m²     General: No acute distress.  Neuro: Alert and oriented ×3. Follows commands.  Head: Atraumatic  Eyes: Pupils equal reactive to light. Extraocular motions intact.  Ears: Hears normal speaking voice.  Mouth, Nose, Throat: Mucous membranes moist.  Normal dentition.  Neck: Supple. No appreciable masses.  Breast: Not examined.  Chest: No crepitus.  No appreciable scars.  Heart: Palpable regular rate and rhythm.  Lung: Clear to auscultation bilaterally.  Vascular: Palpable radial pulses bilaterally.  Abdomen: Soft.  Obese.  " Nondistended. Nontender.  No appreciable hernias.  Well-healed port site incisions.  Rectal: Not examined.  Genitourinary: Not examined.  Musculoskeletal: Moves all extremities.  Normal range of motion.  Lymphatic: No palpable lymph nodes.  Skin: No rashes or lesions.  Psychological: Normal affect        Labs:  ALT/AST is 68/79, hyperglycemia, hypertriglyceridemia     Imaging: I have personally reviewed the images and the radiologist's report.  Ultrasound shows an enlarged liver with fatty infiltration.  It also shows a gallbladder nearly completely filled with stones.  No evidence of cholecystitis.           Assessment:  This is a 33-year-old female with cholelithiasis and biliary colic here to discuss cholecystectomy.  We also extensively discussed switching from nicotine vape to nicotine free vapes.  We discussed that nicotine causes vasoconstriction and impedes healing.    The  risks benefits and indications for surgery were discussed with the patient.  The potential of bleeding, infection, myocardial infarction, pulmonary embolism were reviewed with the patient.  The potential of an open procedure was discussed with the patient.  Strategies for dealing with common bile duct stones to include open, endoscopic, and laparoscopic procedures were reviewed in detail.  Anticipated convalescence was reviewed in detail.  All questions were answered and consent was obtained.     Plan:  -- We will plan for a laparoscopic cholecystectomy with intraoperative cholangiogram 5/1/2024 with follow-up 5/16/2024.  -- We will plan for surgery to be performed as an outpatient.  -- Avoid eating fatty food pre-operatively.  -- We will apply Dermabond, so the patient may shower the day after surgery.  No swimming or tub soaks for 4 weeks post-operatively.  -- No heavy lifting >20lbs for 6 weeks after surgery.           Judy Crowder MD MPH  General Surgery  Office: [phone]  Fax: [phone]

## 2024-04-12 DIAGNOSIS — E28.2 PCOS (POLYCYSTIC OVARIAN SYNDROME): ICD-10-CM

## 2024-04-12 DIAGNOSIS — R73.03 PREDIABETES: ICD-10-CM

## 2024-04-12 RX ORDER — METFORMIN HYDROCHLORIDE 500 MG/1
500 TABLET ORAL DAILY
Qty: 90 TABLET | Refills: 0 | OUTPATIENT
Start: 2024-04-12

## 2024-04-22 DIAGNOSIS — K80.20 CALCULUS OF GALLBLADDER WITHOUT CHOLECYSTITIS WITHOUT OBSTRUCTION: ICD-10-CM

## 2024-05-07 ENCOUNTER — PATIENT MESSAGE (OUTPATIENT)
Dept: PRIMARY CARE | Facility: CLINIC | Age: 34
End: 2024-05-07
Payer: COMMERCIAL

## 2024-05-16 ENCOUNTER — APPOINTMENT (OUTPATIENT)
Dept: SURGERY | Facility: CLINIC | Age: 34
End: 2024-05-16
Payer: COMMERCIAL

## 2024-06-13 DIAGNOSIS — E66.01 MORBID (SEVERE) OBESITY DUE TO EXCESS CALORIES (MULTI): ICD-10-CM

## 2024-06-13 DIAGNOSIS — E88.819 INSULIN RESISTANCE: ICD-10-CM

## 2024-06-13 DIAGNOSIS — E11.9 TYPE 2 DIABETES MELLITUS WITHOUT COMPLICATION, WITHOUT LONG-TERM CURRENT USE OF INSULIN (MULTI): ICD-10-CM

## 2024-06-14 RX ORDER — DULAGLUTIDE 0.75 MG/.5ML
0.75 INJECTION, SOLUTION SUBCUTANEOUS
Qty: 2 ML | Refills: 0 | Status: SHIPPED | OUTPATIENT
Start: 2024-06-16

## 2024-06-14 NOTE — TELEPHONE ENCOUNTER
Refill approved but needs appt for additional refills. She is due in July for her Diabetes follow up please schedule

## 2024-07-02 ENCOUNTER — ANESTHESIA EVENT (OUTPATIENT)
Dept: OPERATING ROOM | Facility: HOSPITAL | Age: 34
End: 2024-07-02
Payer: MEDICARE

## 2024-07-03 ENCOUNTER — APPOINTMENT (OUTPATIENT)
Dept: RADIOLOGY | Facility: HOSPITAL | Age: 34
End: 2024-07-03
Payer: MEDICARE

## 2024-07-03 ENCOUNTER — HOSPITAL ENCOUNTER (OUTPATIENT)
Facility: HOSPITAL | Age: 34
Setting detail: OUTPATIENT SURGERY
Discharge: HOME | End: 2024-07-03
Attending: SURGERY | Admitting: SURGERY
Payer: MEDICARE

## 2024-07-03 ENCOUNTER — ANESTHESIA (OUTPATIENT)
Dept: OPERATING ROOM | Facility: HOSPITAL | Age: 34
End: 2024-07-03
Payer: MEDICARE

## 2024-07-03 VITALS
WEIGHT: 241 LBS | HEART RATE: 88 BPM | SYSTOLIC BLOOD PRESSURE: 123 MMHG | RESPIRATION RATE: 16 BRPM | TEMPERATURE: 97.3 F | DIASTOLIC BLOOD PRESSURE: 60 MMHG | HEIGHT: 63 IN | OXYGEN SATURATION: 97 % | BODY MASS INDEX: 42.7 KG/M2

## 2024-07-03 DIAGNOSIS — G89.18 POST-OP PAIN: ICD-10-CM

## 2024-07-03 DIAGNOSIS — K80.20 CALCULUS OF GALLBLADDER WITHOUT CHOLECYSTITIS WITHOUT OBSTRUCTION: Primary | ICD-10-CM

## 2024-07-03 LAB
ALBUMIN SERPL BCP-MCNC: 4 G/DL (ref 3.4–5)
ALP SERPL-CCNC: 38 U/L (ref 33–110)
ALT SERPL W P-5'-P-CCNC: 72 U/L (ref 7–45)
AMYLASE SERPL-CCNC: 37 U/L (ref 29–103)
ANION GAP SERPL CALC-SCNC: 14 MMOL/L (ref 10–20)
AST SERPL W P-5'-P-CCNC: 63 U/L (ref 9–39)
BILIRUB DIRECT SERPL-MCNC: 0.1 MG/DL (ref 0–0.3)
BILIRUB SERPL-MCNC: 0.3 MG/DL (ref 0–1.2)
BUN SERPL-MCNC: 15 MG/DL (ref 6–23)
CALCIUM SERPL-MCNC: 9.2 MG/DL (ref 8.6–10.3)
CHLORIDE SERPL-SCNC: 102 MMOL/L (ref 98–107)
CO2 SERPL-SCNC: 25 MMOL/L (ref 21–32)
CREAT SERPL-MCNC: 1.09 MG/DL (ref 0.5–1.05)
EGFRCR SERPLBLD CKD-EPI 2021: 69 ML/MIN/1.73M*2
ERYTHROCYTE [DISTWIDTH] IN BLOOD BY AUTOMATED COUNT: 12.1 % (ref 11.5–14.5)
GLUCOSE BLD MANUAL STRIP-MCNC: 103 MG/DL (ref 74–99)
GLUCOSE SERPL-MCNC: 101 MG/DL (ref 74–99)
HCG UR QL IA.RAPID: NEGATIVE
HCT VFR BLD AUTO: 38.2 % (ref 36–46)
HGB BLD-MCNC: 12.8 G/DL (ref 12–16)
MCH RBC QN AUTO: 31.6 PG (ref 26–34)
MCHC RBC AUTO-ENTMCNC: 33.5 G/DL (ref 32–36)
MCV RBC AUTO: 94 FL (ref 80–100)
NRBC BLD-RTO: 0 /100 WBCS (ref 0–0)
PLATELET # BLD AUTO: 306 X10*3/UL (ref 150–450)
POTASSIUM SERPL-SCNC: 3.8 MMOL/L (ref 3.5–5.3)
PROT SERPL-MCNC: 7.1 G/DL (ref 6.4–8.2)
RBC # BLD AUTO: 4.05 X10*6/UL (ref 4–5.2)
SODIUM SERPL-SCNC: 137 MMOL/L (ref 136–145)
WBC # BLD AUTO: 6.2 X10*3/UL (ref 4.4–11.3)

## 2024-07-03 PROCEDURE — 2500000005 HC RX 250 GENERAL PHARMACY W/O HCPCS: Performed by: ANESTHESIOLOGIST ASSISTANT

## 2024-07-03 PROCEDURE — A47563 PR LAP,CHOLECYSTECTOMY/GRAPH: Performed by: ANESTHESIOLOGIST ASSISTANT

## 2024-07-03 PROCEDURE — 3700000002 HC GENERAL ANESTHESIA TIME - EACH INCREMENTAL 1 MINUTE: Performed by: SURGERY

## 2024-07-03 PROCEDURE — 74300 X-RAY BILE DUCTS/PANCREAS: CPT | Performed by: SURGERY

## 2024-07-03 PROCEDURE — 2500000001 HC RX 250 WO HCPCS SELF ADMINISTERED DRUGS (ALT 637 FOR MEDICARE OP): Performed by: STUDENT IN AN ORGANIZED HEALTH CARE EDUCATION/TRAINING PROGRAM

## 2024-07-03 PROCEDURE — 7100000001 HC RECOVERY ROOM TIME - INITIAL BASE CHARGE: Performed by: SURGERY

## 2024-07-03 PROCEDURE — 82947 ASSAY GLUCOSE BLOOD QUANT: CPT

## 2024-07-03 PROCEDURE — 3700000001 HC GENERAL ANESTHESIA TIME - INITIAL BASE CHARGE: Performed by: SURGERY

## 2024-07-03 PROCEDURE — 82150 ASSAY OF AMYLASE: CPT | Performed by: SURGERY

## 2024-07-03 PROCEDURE — 3600000008 HC OR TIME - EACH INCREMENTAL 1 MINUTE - PROCEDURE LEVEL THREE: Performed by: SURGERY

## 2024-07-03 PROCEDURE — 3600000003 HC OR TIME - INITIAL BASE CHARGE - PROCEDURE LEVEL THREE: Performed by: SURGERY

## 2024-07-03 PROCEDURE — A47563 PR LAP,CHOLECYSTECTOMY/GRAPH: Performed by: STUDENT IN AN ORGANIZED HEALTH CARE EDUCATION/TRAINING PROGRAM

## 2024-07-03 PROCEDURE — 7100000009 HC PHASE TWO TIME - INITIAL BASE CHARGE: Performed by: SURGERY

## 2024-07-03 PROCEDURE — 81025 URINE PREGNANCY TEST: CPT | Performed by: SURGERY

## 2024-07-03 PROCEDURE — 96372 THER/PROPH/DIAG INJ SC/IM: CPT | Mod: 59 | Performed by: SURGERY

## 2024-07-03 PROCEDURE — 47563 LAPARO CHOLECYSTECTOMY/GRAPH: CPT | Performed by: SURGERY

## 2024-07-03 PROCEDURE — 7100000002 HC RECOVERY ROOM TIME - EACH INCREMENTAL 1 MINUTE: Performed by: SURGERY

## 2024-07-03 PROCEDURE — C1887 CATHETER, GUIDING: HCPCS | Performed by: SURGERY

## 2024-07-03 PROCEDURE — 36415 COLL VENOUS BLD VENIPUNCTURE: CPT | Performed by: SURGERY

## 2024-07-03 PROCEDURE — 88304 TISSUE EXAM BY PATHOLOGIST: CPT | Mod: TC,STJLAB | Performed by: SURGERY

## 2024-07-03 PROCEDURE — 2500000005 HC RX 250 GENERAL PHARMACY W/O HCPCS: Performed by: SURGERY

## 2024-07-03 PROCEDURE — 2500000004 HC RX 250 GENERAL PHARMACY W/ HCPCS (ALT 636 FOR OP/ED): Performed by: ANESTHESIOLOGIST ASSISTANT

## 2024-07-03 PROCEDURE — 88304 TISSUE EXAM BY PATHOLOGIST: CPT | Performed by: STUDENT IN AN ORGANIZED HEALTH CARE EDUCATION/TRAINING PROGRAM

## 2024-07-03 PROCEDURE — 80048 BASIC METABOLIC PNL TOTAL CA: CPT | Performed by: SURGERY

## 2024-07-03 PROCEDURE — 2500000004 HC RX 250 GENERAL PHARMACY W/ HCPCS (ALT 636 FOR OP/ED): Performed by: SURGERY

## 2024-07-03 PROCEDURE — 85027 COMPLETE CBC AUTOMATED: CPT | Performed by: SURGERY

## 2024-07-03 PROCEDURE — 2720000007 HC OR 272 NO HCPCS: Performed by: SURGERY

## 2024-07-03 PROCEDURE — 7100000010 HC PHASE TWO TIME - EACH INCREMENTAL 1 MINUTE: Performed by: SURGERY

## 2024-07-03 PROCEDURE — 82248 BILIRUBIN DIRECT: CPT | Performed by: SURGERY

## 2024-07-03 PROCEDURE — 99223 1ST HOSP IP/OBS HIGH 75: CPT | Performed by: SURGERY

## 2024-07-03 RX ORDER — ROCURONIUM BROMIDE 10 MG/ML
INJECTION, SOLUTION INTRAVENOUS AS NEEDED
Status: DISCONTINUED | OUTPATIENT
Start: 2024-07-03 | End: 2024-07-03

## 2024-07-03 RX ORDER — LIDOCAINE HYDROCHLORIDE 10 MG/ML
0.1 INJECTION INFILTRATION; PERINEURAL ONCE
Status: DISCONTINUED | OUTPATIENT
Start: 2024-07-03 | End: 2024-07-03 | Stop reason: HOSPADM

## 2024-07-03 RX ORDER — SODIUM CHLORIDE, SODIUM LACTATE, POTASSIUM CHLORIDE, CALCIUM CHLORIDE 600; 310; 30; 20 MG/100ML; MG/100ML; MG/100ML; MG/100ML
100 INJECTION, SOLUTION INTRAVENOUS CONTINUOUS
Status: DISCONTINUED | OUTPATIENT
Start: 2024-07-03 | End: 2024-07-03 | Stop reason: HOSPADM

## 2024-07-03 RX ORDER — HYDRALAZINE HYDROCHLORIDE 20 MG/ML
5 INJECTION INTRAMUSCULAR; INTRAVENOUS EVERY 30 MIN PRN
Status: DISCONTINUED | OUTPATIENT
Start: 2024-07-03 | End: 2024-07-03 | Stop reason: HOSPADM

## 2024-07-03 RX ORDER — LIDOCAINE HYDROCHLORIDE 20 MG/ML
INJECTION, SOLUTION EPIDURAL; INFILTRATION; INTRACAUDAL; PERINEURAL AS NEEDED
Status: DISCONTINUED | OUTPATIENT
Start: 2024-07-03 | End: 2024-07-03

## 2024-07-03 RX ORDER — BUPIVACAINE HCL/EPINEPHRINE 0.5-1:200K
VIAL (ML) INJECTION AS NEEDED
Status: DISCONTINUED | OUTPATIENT
Start: 2024-07-03 | End: 2024-07-03 | Stop reason: HOSPADM

## 2024-07-03 RX ORDER — ONDANSETRON HYDROCHLORIDE 2 MG/ML
4 INJECTION, SOLUTION INTRAVENOUS ONCE AS NEEDED
Status: DISCONTINUED | OUTPATIENT
Start: 2024-07-03 | End: 2024-07-03 | Stop reason: HOSPADM

## 2024-07-03 RX ORDER — OXYCODONE HYDROCHLORIDE 5 MG/1
5 TABLET ORAL EVERY 6 HOURS PRN
Qty: 5 TABLET | Refills: 0 | Status: SHIPPED | OUTPATIENT
Start: 2024-07-03 | End: 2024-07-19 | Stop reason: ALTCHOICE

## 2024-07-03 RX ORDER — ONDANSETRON HYDROCHLORIDE 2 MG/ML
INJECTION, SOLUTION INTRAVENOUS AS NEEDED
Status: DISCONTINUED | OUTPATIENT
Start: 2024-07-03 | End: 2024-07-03

## 2024-07-03 RX ORDER — HEPARIN SODIUM 5000 [USP'U]/ML
5000 INJECTION, SOLUTION INTRAVENOUS; SUBCUTANEOUS ONCE
Status: COMPLETED | OUTPATIENT
Start: 2024-07-03 | End: 2024-07-03

## 2024-07-03 RX ORDER — DOCUSATE SODIUM 100 MG/1
200 CAPSULE, LIQUID FILLED ORAL DAILY
Qty: 30 CAPSULE | Refills: 0 | Status: SHIPPED | OUTPATIENT
Start: 2024-07-03 | End: 2024-07-18

## 2024-07-03 RX ORDER — CIPROFLOXACIN 2 MG/ML
400 INJECTION, SOLUTION INTRAVENOUS ONCE
Status: COMPLETED | OUTPATIENT
Start: 2024-07-03 | End: 2024-07-03

## 2024-07-03 RX ORDER — PROPOFOL 10 MG/ML
INJECTION, EMULSION INTRAVENOUS AS NEEDED
Status: DISCONTINUED | OUTPATIENT
Start: 2024-07-03 | End: 2024-07-03

## 2024-07-03 RX ORDER — HYDROMORPHONE HYDROCHLORIDE 1 MG/ML
INJECTION, SOLUTION INTRAMUSCULAR; INTRAVENOUS; SUBCUTANEOUS AS NEEDED
Status: DISCONTINUED | OUTPATIENT
Start: 2024-07-03 | End: 2024-07-03

## 2024-07-03 RX ORDER — SODIUM CHLORIDE, SODIUM LACTATE, POTASSIUM CHLORIDE, CALCIUM CHLORIDE 600; 310; 30; 20 MG/100ML; MG/100ML; MG/100ML; MG/100ML
INJECTION, SOLUTION INTRAVENOUS CONTINUOUS PRN
Status: DISCONTINUED | OUTPATIENT
Start: 2024-07-03 | End: 2024-07-03

## 2024-07-03 RX ORDER — FENTANYL CITRATE 50 UG/ML
INJECTION, SOLUTION INTRAMUSCULAR; INTRAVENOUS AS NEEDED
Status: DISCONTINUED | OUTPATIENT
Start: 2024-07-03 | End: 2024-07-03

## 2024-07-03 RX ORDER — SUCCINYLCHOLINE CHLORIDE 100 MG/5ML
SYRINGE (ML) INTRAVENOUS AS NEEDED
Status: DISCONTINUED | OUTPATIENT
Start: 2024-07-03 | End: 2024-07-03

## 2024-07-03 RX ORDER — LABETALOL HYDROCHLORIDE 5 MG/ML
5 INJECTION, SOLUTION INTRAVENOUS ONCE AS NEEDED
Status: DISCONTINUED | OUTPATIENT
Start: 2024-07-03 | End: 2024-07-03 | Stop reason: HOSPADM

## 2024-07-03 RX ORDER — OXYCODONE HYDROCHLORIDE 5 MG/1
5 TABLET ORAL EVERY 4 HOURS PRN
Status: DISCONTINUED | OUTPATIENT
Start: 2024-07-03 | End: 2024-07-03 | Stop reason: HOSPADM

## 2024-07-03 RX ORDER — METRONIDAZOLE 500 MG/100ML
500 INJECTION, SOLUTION INTRAVENOUS ONCE
Status: COMPLETED | OUTPATIENT
Start: 2024-07-03 | End: 2024-07-03

## 2024-07-03 RX ORDER — METRONIDAZOLE 500 MG/100ML
INJECTION, SOLUTION INTRAVENOUS AS NEEDED
Status: DISCONTINUED | OUTPATIENT
Start: 2024-07-03 | End: 2024-07-03

## 2024-07-03 RX ORDER — ALBUTEROL SULFATE 0.83 MG/ML
2.5 SOLUTION RESPIRATORY (INHALATION) ONCE AS NEEDED
Status: DISCONTINUED | OUTPATIENT
Start: 2024-07-03 | End: 2024-07-03 | Stop reason: HOSPADM

## 2024-07-03 RX ORDER — MIDAZOLAM HYDROCHLORIDE 1 MG/ML
INJECTION, SOLUTION INTRAMUSCULAR; INTRAVENOUS AS NEEDED
Status: DISCONTINUED | OUTPATIENT
Start: 2024-07-03 | End: 2024-07-03

## 2024-07-03 RX ORDER — FENTANYL CITRATE 50 UG/ML
50 INJECTION, SOLUTION INTRAMUSCULAR; INTRAVENOUS EVERY 5 MIN PRN
Status: DISCONTINUED | OUTPATIENT
Start: 2024-07-03 | End: 2024-07-03 | Stop reason: HOSPADM

## 2024-07-03 ASSESSMENT — PAIN DESCRIPTION - DESCRIPTORS
DESCRIPTORS: ACHING;SORE
DESCRIPTORS: ACHING;SORE
DESCRIPTORS: ACHING

## 2024-07-03 ASSESSMENT — PAIN - FUNCTIONAL ASSESSMENT
PAIN_FUNCTIONAL_ASSESSMENT: 0-10

## 2024-07-03 ASSESSMENT — PAIN SCALES - GENERAL
PAINLEVEL_OUTOF10: 0 - NO PAIN
PAINLEVEL_OUTOF10: 5 - MODERATE PAIN
PAINLEVEL_OUTOF10: 4
PAINLEVEL_OUTOF10: 2
PAINLEVEL_OUTOF10: 4

## 2024-07-03 ASSESSMENT — COLUMBIA-SUICIDE SEVERITY RATING SCALE - C-SSRS
6. HAVE YOU EVER DONE ANYTHING, STARTED TO DO ANYTHING, OR PREPARED TO DO ANYTHING TO END YOUR LIFE?: NO
1. IN THE PAST MONTH, HAVE YOU WISHED YOU WERE DEAD OR WISHED YOU COULD GO TO SLEEP AND NOT WAKE UP?: NO
2. HAVE YOU ACTUALLY HAD ANY THOUGHTS OF KILLING YOURSELF?: NO

## 2024-07-03 NOTE — DISCHARGE INSTRUCTIONS
1. Any questions regarding your surgery or procedure are always welcomed at my office, [phone]. Any questions about caring for the wound, convalescence, nausea, pain medication, intestinal function, or any other aspect of surgery should be directed to my office. I will provide any prescriptions for pain medication, antibiotics or any other procedure related needs. If there is skin glue over incisions, it will fall off in 1-2 weeks. If there is dressing you can remove the wound dressing on the second day after the surgery. After removing the dressing, the wound doesn't need to be recovered. Again, the wound may be open to the air. If keeping the wound covered makes you more comfortable, then the dressing should be changed daily. Antibiotic creams or ointments are not required. If you feel that Neosporin or similar product would be helpful, then it is okay to use them. If there is any question about the potential of an infection, then call my office. You can get the wound wet when the dressing is removed. A short shower or bath for the first week is allowed. I wouldn't soak in a hot tub or take a greater than 10 minute bath for the first week.  2.When you go home start taking Tylenol alternating with Ibuprofen every 4 hrs for the first 24-48 hrs for general pain and inflamation.  3. Any questions about your regular medications for blood pressure, diabetes, heart or breathing problems, cholesterol, arthritis or other baseline health issue, should be directed to your family doctor. I cannot provide prescription refills for these medications.  I will always try to review medications with you so there is no confusion.   4. In the long term, you can eat whatever you like. However for the first 24 to 48 hours after anesthesia, you should eat lightly. Two or three small meals is preferred over one big meal. Easy to digest items (liquids, soda, soup, toast, broth, jello) are suggested. A loss of appetite or even nausea  is extremely common after any anesthesia. Avoid the Thanksgiving feast, pepperoni pizza and spicy foods for those first several days.  Pain pills, inactivity and convalescence can lead to constipation. I suggest taking Metamucil or other bulk fiber product to avoid this. Constipation should resolve as you get back to regular eating, regular activity and your regular schedule. If the Metamucil doesn't work and several days pass without a bowel movement, then a small dose of Milk of Magnesia (1-2 tablespoons) can be tried. Be careful about taking a second dose of MOM; you can rebound with significant loose stools.  5. I need to see you in the office at your scheduled follow up. At that time, I will check the wounds, review any X-ray or pathology reports, and make arrangements for any other needs.  6. Any paperwork relevant to the surgery such as FMLA, disability, insurance, estimates for return to work, etc., should be brought to my office to be filled out at the post-operative visit.  7. There is a prescription for pain medication.  For the first several days, I would encourage you to take the pain medicine regularly, even if you don't hurt a lot. Keeping a constant level of pain medicine in your system works much better than trying to catch up. After several days, you can reduce the amount of pain medicine as needed. You can take over the counter Tylenol alternating with Ibuprofen (Motrin) every 4hrs in addition to the prescription pain pills. The combination of the pain pills and the Tylenol /Motrin works much better than either alone.  Don't drive while taking the narcotics. Also, don't drink alcoholic beverages while taking the pain pills.  8. If you have chest pain or shortness of breath go the emergency room. If there are questions about the wound, pain medication, swelling or redness, call the office first. Most issues can be solved without an ER visit. Call [phone].

## 2024-07-03 NOTE — OP NOTE
Cholecystectomy Laparoscopy with Cholangiogram Operative Note     Date: 7/3/2024  OR Location: [facility] OR    Name: Levi Ramírez, : 1990, Age: 34 y.o., MRN: [id number], Sex: female    Diagnosis  Pre-op Diagnosis     * Calculus of gallbladder without cholecystitis without obstruction [K80.20] Post-op Diagnosis     * Calculus of gallbladder without cholecystitis without obstruction [K80.20]     Procedures  Cholecystectomy Laparoscopy with Cholangiogram  03376 - [place] LAPS SURG CHOLECYSTECTOMY W/CHOLANGIOGRAPHY      Surgeons      * Judy Crowder - Primary    Resident/Fellow/Other Assistant:  Surgeons and Role:  * No surgeons found with a matching role *    Procedure Summary  Anesthesia: Anesthesia type not filed in the log.  ASA: III  Anesthesia Staff: Anesthesiologist: Jackie Quiñonez MD  C-AA: PATIENCE Gonzalez  Estimated Blood Loss: 1mL  Intra-op Medications:   Administrations occurring from 0730 to 1000 on 24:   Medication Name Total Dose   BUPivacaine-EPINEPHrine (Marcaine w/EPI) 0.5 %-1:200,000 injection 20 mL              Anesthesia Record               Intraprocedure I/O Totals       None           Specimen:   ID Type Source Tests Collected by Time   1 : GALLBLADDER Tissue GALLBLADDER CHOLECYSTECTOMY SURGICAL PATHOLOGY EXAM Judy Crowder MD 7/3/2024 0842        Staff:   Sea: Trish  Circulator: Maryam Richardson Person: Rodney Alanisub Person: Carly         Drains and/or Catheters: * None in log *    Tourniquet Times:         Implants:     Findings: Contracted gallbladder full of stones.  Cholangiogram shows patent ducts into the liver and down into the duodenum, without signs of filling defect/stones.    Indications: Levi Ramírez is an 34 y.o. female who is having surgery for Calculus of gallbladder without cholecystitis without obstruction [K80.20].     The risks benefits and indications for surgery were discussed with the patient. The potential of bleeding, infection, myocardial infarction,  and pulmonary embolism were reviewed with the patient. The potential of an open procedure was discussed with  the patient. Strategies for dealing with common bile duct stones to include open, endoscopic, and laparoscopic procedures were reviewed in detail. Anticipated convalescence was reviewed in detail. All questions were answered and consent was obtained.       DESCRIPTION OF PROCEDURE:   The patient was taken to the operating room and placed on the operative table in supine position.  Following induction of general anesthetic, the patient was intubated endotracheally.  Time-out had been performed per protocol, and she received preoperative IV antibiotics and DVT prophylaxis.  Following intubation, an orogastric tube was placed, left arm was tucked, and the abdominal wall prepped, and draped sterilely. The abdomen was entered via a 12 mm open Lee technique supraumbilically and insufflated  with carbon dioxide to a pressure of 15 mmHg. Then she was placed in a reverse Trendelenburg position with right side up. A 12 mm subxiphoid port was next placed, and 5 mm right lateral subcostal and right lateral mid abdominal ports were placed under direct  visualization.  The gallbladder was very contracted and intrahepatic, full of stones. The fundus of the gallbladder was able to be safely grasped and raised. The gallbladder was then retracted cephalad.  The lateral aspect of the gallbladder was initially dissected identifying the infundibular cystic duct junction.  With inferior and lateral retraction of the gallbladder, cystic duct and cystic artery were circumferentially dissected identifying the critical view with visualization of the liver between the 2 tubular structures.  The cystic artery was clipped thrice and  divided prior to intraoperative cholangiogram.  A clip was placed on the gallbladder side of the cystic duct before making a small ductotomy. Cholangiogram was performed and it showed patent ducts  throughout the liver and down into the duodenum. The cystic duct had a generous cuff before entering the CBD. The cystic duct was clipped twice distally and the ductotomy was completed. The cystic artery was already clipped and divided. The gallbladder was removed off of the gallbladder fossa with hook cautery and placed in endocatch  bag and brought out through the subxiphoid port. Evaluation of the area showed no evidence of bleeding or viscous or  bilious leakage. Ports were removed under direct visualization after desufflation. Subxiphoid and infraumbilical ports were closed at the fascial level with a figure of eight 0 Vicryl suture, 3-0 Vicryl single stitch was placed to bring together the deep  dermis of the 12mm pots, and the skin was closed at all sites with subcuticular 4-0 Monocryl suture.  0.25% Marcaine with epi was instilled. Dermabond applied.  Instrument, sponge, and needle counts were correct x2.  Surgeon and surgical assistant were present throughout  the entire procedure.  The patient was taken to the recovery room in stable condition.           Judy Crowder MD MPH   General Surgery   [phone]   Office: [phone]   Fax: [phone]       Complications:  None; patient tolerated the procedure well.    Disposition: PACU - hemodynamically stable.  Condition: stable         Additional Details: May shower tomorrow    Attending Attestation: I was present and scrubbed for the entire procedure.    Judy Crowder  Phone Number: [phone]

## 2024-07-03 NOTE — ANESTHESIA PROCEDURE NOTES
Airway  Date/Time: 7/3/2024 7:47 AM  Urgency: elective    Airway not difficult    Staffing  Performed: PATIENCE   Authorized by: Jackie Quiñonez MD    Performed by: PATIENCE Gonzalez  Patient location during procedure: OR    Indications and Patient Condition  Indications for airway management: anesthesia  Spontaneous Ventilation: absent  Sedation level: deep  Preoxygenated: yes  Patient position: reverse Trendelenburg  Mask difficulty assessment: 0 - not attempted    Final Airway Details  Final airway type: endotracheal airway      Successful airway: ETT  Cuffed: yes   Successful intubation technique: video laryngoscopy  Facilitating devices/methods: intubating stylet  Endotracheal tube insertion site: oral  Blade: Carlota  Blade size: #3  ETT size (mm): 7.0  Cormack-Lehane Classification: grade I - full view of glottis  Placement verified by: chest auscultation and capnometry   Measured from: lips  ETT to lips (cm): 21  Number of attempts at approach: 1

## 2024-07-03 NOTE — ANESTHESIA PREPROCEDURE EVALUATION
Patient: Levi Ramírez    Procedure Information       Date/Time: 07/03/24 0730    Procedure: Cholecystectomy Laparoscopy with Cholangiogram    Location: [facility] OR 07 / [facility]    Surgeons: Judy Crowder MD            Relevant Problems   Anesthesia (within normal limits)      Neuro   (+) Episode of recurrent major depressive disorder (CMS-HCC)   (+) Generalized anxiety disorder   (+) PTSD (post-traumatic stress disorder)      GI   (+) GERD (gastroesophageal reflux disease)   (+) IBS (irritable bowel syndrome)      Liver   (+) Calculus of gallbladder without cholecystitis without obstruction   (+) Elevated LFTs      Endocrine   (+) Hypothyroidism   (+) Insulin resistance   (+) Morbid (severe) obesity due to excess calories (Multi)   (+) Weight gain      GYN   (+) PCOS (polycystic ovarian syndrome)      Nervous   (+) Chronic pain in female pelvis      Circulatory   (+) Sinus tachycardia      Digestive   (+) Chronic constipation      Mental Health   (+) Anorexia nervosa in remission (Multi)       Clinical information reviewed:   Tobacco  Allergies  Meds   Med Hx  Surg Hx   Fam Hx  Soc Hx        NPO Detail:  No data recorded     Physical Exam    Airway  Mallampati: III  TM distance: >3 FB  Neck ROM: full     Cardiovascular   Rhythm: regular  Rate: normal     Dental    Pulmonary   Breath sounds clear to auscultation     Abdominal   Abdomen: soft             Anesthesia Plan    History of general anesthesia?: yes  History of complications of general anesthesia?: no    ASA 3     general     intravenous induction   Postoperative administration of opioids is intended.  Anesthetic plan and risks discussed with patient.  Use of blood products discussed with patient who consented to blood products.    Plan discussed with CAA, CRNA and attending.      
20

## 2024-07-03 NOTE — H&P
"History and Physical        Referring Provider: Dr. An So        Chief Complaint:  Cholelithiasis, biliary colic        History of Present Illness:  This is a 33-year-old female who was incidentally found to have a gallbladder full of stones while having a FibroScan.  Patient states that she normally does not have the usual signs and symptoms however when asked further she has severe GERD, epigastric pain, and back pain.        Past Medical History:  Hypothyroidism, prediabetic, insomnia, anxiety/depression, chronic constipation, PCOS, morbid obesity, history of anorexia nervosa     Past Surgical History:  Tubal ligation and wisdom teeth removal        Medications:  As per medical record        Allergies:  Amoxicillin     Family History:  The information was reviewed and no pertinent findings are relevant to the presenting problem.        Social History:  Patient lives with 3 friends, she is an  at [employer], she vapes nicotine, occasionally drinks EtOH, denies IDU.     Review of Systems  A complete 10 point review of systems was performed and is negative except as noted in the history of present illness.           Physical Exam:   /90 (BP Location: Right arm, Patient Position: Sitting, BP Cuff Size: Large adult)   Pulse 90   Temp 36.3 °C (97.3 °F) (Temporal)   Resp 16   Ht 1.6 m (5' 3\")   Wt 112 kg (246 lb 9.6 oz)   SpO2 99%   BMI 43.68 kg/m²      General: No acute distress.  Neuro: Alert and oriented ×3. Follows commands.  Head: Atraumatic  Eyes: Pupils equal reactive to light. Extraocular motions intact.  Ears: Hears normal speaking voice.  Mouth, Nose, Throat: Mucous membranes moist.  Normal dentition.  Neck: Supple. No appreciable masses.  Breast: Not examined.  Chest: No crepitus.  No appreciable scars.  Heart: Palpable regular rate and rhythm.  Lung: Clear to auscultation bilaterally.  Vascular: Palpable radial pulses bilaterally.  Abdomen: Soft.  Obese.  " Nondistended. Nontender.  No appreciable hernias.  Well-healed port site incisions.  Rectal: Not examined.  Genitourinary: Not examined.  Musculoskeletal: Moves all extremities.  Normal range of motion.  Lymphatic: No palpable lymph nodes.  Skin: No rashes or lesions.  Psychological: Normal affect        Labs:  ALT/AST is 68/79, hyperglycemia, hypertriglyceridemia     Imaging: I have personally reviewed the images and the radiologist's report.  Ultrasound shows an enlarged liver with fatty infiltration.  It also shows a gallbladder nearly completely filled with stones.  No evidence of cholecystitis.           Assessment:  This is a 33-year-old female with cholelithiasis and biliary colic here to discuss cholecystectomy.  We also extensively discussed switching from nicotine vape to nicotine free vapes.  We discussed that nicotine causes vasoconstriction and impedes healing.     The  risks benefits and indications for surgery were discussed with the patient.  The potential of bleeding, infection, myocardial infarction, pulmonary embolism were reviewed with the patient.  The potential of an open procedure was discussed with the patient.  Strategies for dealing with common bile duct stones to include open, endoscopic, and laparoscopic procedures were reviewed in detail.  Anticipated convalescence was reviewed in detail.  All questions were answered and consent was obtained.     Plan:  -- We will plan for a laparoscopic cholecystectomy with intraoperative cholangiogram 7/3/24. Surgery was postponed due to insurance coverage. Pt has no changes since last seen in the office.  -- We will plan for surgery to be performed as an outpatient.  -- Avoid eating fatty food pre-operatively.  -- We will apply Dermabond, so the patient may shower the day after surgery.  No swimming or tub soaks for 4 weeks post-operatively.  -- No heavy lifting >20lbs for 6 weeks after surgery.           Judy Crowder MD MPH  General  Surgery  Office: [phone]  Fax: [phone]

## 2024-07-08 ENCOUNTER — APPOINTMENT (OUTPATIENT)
Dept: SURGERY | Facility: CLINIC | Age: 34
End: 2024-07-08
Payer: COMMERCIAL

## 2024-07-08 VITALS
HEART RATE: 83 BPM | RESPIRATION RATE: 16 BRPM | DIASTOLIC BLOOD PRESSURE: 81 MMHG | SYSTOLIC BLOOD PRESSURE: 126 MMHG | OXYGEN SATURATION: 100 % | TEMPERATURE: 97.9 F

## 2024-07-08 DIAGNOSIS — K80.20 CALCULUS OF GALLBLADDER WITHOUT CHOLECYSTITIS WITHOUT OBSTRUCTION: Primary | ICD-10-CM

## 2024-07-08 PROCEDURE — 99024 POSTOP FOLLOW-UP VISIT: CPT | Performed by: SURGERY

## 2024-07-08 NOTE — PROGRESS NOTES
Follow-Up Note        Referring Provider: Dr. An So        Chief Complaint:  Follow up from cholecystectomy        History of Present Illness:  This is a 34-year-old female who presents for follow-up after laparoscopic cholecystectomy with intraoperative cholangiogram on 7/3/2024.  She has been healing well since surgery, no difficulty eating, having normal bowel movements, and surgical pain has resolved.        Past Medical History:  Refer to H&P         Past Surgical History:  Refer to H&P         Medications:  Refer to H&P         Allergies:  Refer to H&P         Family History:  Refer to H&P         Social History:  Refer to H&P         Review of Systems  A complete 10 point review of systems was performed and is negative except as noted in the history of present illness.    Physical Exam:   /81 (BP Location: Right arm, Patient Position: Sitting, BP Cuff Size: Large adult)   Pulse 83   Temp 36.6 °C (97.9 °F) (Temporal)   Resp 16   SpO2 100%     General: No acute distress. Sitting up in chair.  Neuro: Alert and oriented ×3. Follows commands.  Head: Atraumatic  Eyes: Pupils equal reactive to light. Extraocular motions intact.  Ears: Hears normal speaking voice.  Mouth, Nose, Throat: Mucous membranes moist.  Normal dentition.  Neck: Supple. No appreciable masses.  Chest: No crepitus.    Heart: Regular rate and rhythm.  Lung: Clear to auscultation bilaterally.  Vascular: No carotid bruits.  Palpable radial pulses bilaterally.  Abdomen: Soft. Nondistended. Nontender.  Well-healed laparoscopic port sites.  Musculoskeletal: Moves all extremities.  Normal range of motion.  Lymphatic: No palpable lymph nodes.  Skin: No rashes or lesions.  Psychological: Normal affect     Pathology:   Not yet resulted        Assessment:  This is a 34-year-old female who presents for follow up of a laparoscopic cholecystectomy  with intraoperative cholangiogram for symptomatic cholelithiasis.  She has been doing very  well since surgery.  She has no complaints.  There is no evidence of any obvious complications.           Plan:  -- OK to swim in 2weeks.  -- No lifting anything heavier than 20 pounds for another 4 weeks, which is 6 weeks postoperative.  -- At this point, there is no specific need to follow up with me.  If she has any new questions or concerns, she may return at any time.        Judy Crowder MD MPH  General Surgery  Office: [phone]  Fax: [phone]

## 2024-07-08 NOTE — ANESTHESIA POSTPROCEDURE EVALUATION
Patient: Levi Ramírez    Procedure Summary       Date: 07/03/24 Room / Location: [facility] OR  / [facility]    Anesthesia Start: 0730 Anesthesia Stop: 0926    Procedure: Cholecystectomy Laparoscopy with Cholangiogram (Abdomen) Diagnosis:       Calculus of gallbladder without cholecystitis without obstruction      (Calculus of gallbladder without cholecystitis without obstruction [K80.20])    Surgeons: Judy Crowder MD Responsible Provider: Jackie Quiñonez MD    Anesthesia Type: general ASA Status: 3            Anesthesia Type: general    Vitals Value Taken Time   /61 07/03/24 0945   Temp 36.3 °C (97.3 °F) 07/03/24 0945   Pulse 93 07/03/24 0945   Resp 20 07/03/24 0945   SpO2 99 % 07/03/24 0945       Anesthesia Post Evaluation    Patient location during evaluation: PACU  Patient participation: complete - patient participated  Level of consciousness: awake and alert  Pain management: adequate  Airway patency: patent  Cardiovascular status: acceptable  Respiratory status: acceptable  Hydration status: acceptable  Postoperative Nausea and Vomiting: none        No notable events documented.

## 2024-07-12 ENCOUNTER — TELEPHONE (OUTPATIENT)
Dept: PRIMARY CARE | Facility: CLINIC | Age: 34
End: 2024-07-12
Payer: MEDICARE

## 2024-07-12 NOTE — TELEPHONE ENCOUNTER
Pt called. She states that she had surgery last week to have her gallbladder removed. There is itching around the incision, which she knows/feels is normal as part of the healing process. However, the rest of her torso is red/irritated and itchy. She was given a stool softener after surgery, she doesn't believe that she has been on it before so she is unsure if she is having an allergic reaction.     Patient has NOT yet contacted her surgeon to discuss these concerns.     Please advise.

## 2024-07-16 ENCOUNTER — APPOINTMENT (OUTPATIENT)
Dept: SURGERY | Facility: CLINIC | Age: 34
End: 2024-07-16
Payer: COMMERCIAL

## 2024-07-19 ENCOUNTER — APPOINTMENT (OUTPATIENT)
Dept: PRIMARY CARE | Facility: CLINIC | Age: 34
End: 2024-07-19
Payer: COMMERCIAL

## 2024-07-19 ENCOUNTER — LAB (OUTPATIENT)
Dept: LAB | Facility: LAB | Age: 34
End: 2024-07-19
Payer: MEDICARE

## 2024-07-19 VITALS
BODY MASS INDEX: 42.87 KG/M2 | RESPIRATION RATE: 16 BRPM | OXYGEN SATURATION: 98 % | TEMPERATURE: 97.7 F | DIASTOLIC BLOOD PRESSURE: 80 MMHG | HEART RATE: 93 BPM | SYSTOLIC BLOOD PRESSURE: 126 MMHG | WEIGHT: 242 LBS

## 2024-07-19 DIAGNOSIS — E11.9 TYPE 2 DIABETES MELLITUS WITHOUT COMPLICATION, UNSPECIFIED WHETHER LONG TERM INSULIN USE (MULTI): ICD-10-CM

## 2024-07-19 DIAGNOSIS — E03.9 HYPOTHYROIDISM, UNSPECIFIED TYPE: ICD-10-CM

## 2024-07-19 DIAGNOSIS — E88.819 INSULIN RESISTANCE: Primary | ICD-10-CM

## 2024-07-19 LAB
POC HEMOGLOBIN A1C: 5.9 % (ref 4.2–6.5)
T4 FREE SERPL-MCNC: 0.74 NG/DL (ref 0.61–1.12)
TSH SERPL-ACNC: 2.27 MIU/L (ref 0.44–3.98)

## 2024-07-19 PROCEDURE — 3079F DIAST BP 80-89 MM HG: CPT | Performed by: FAMILY MEDICINE

## 2024-07-19 PROCEDURE — 83036 HEMOGLOBIN GLYCOSYLATED A1C: CPT | Performed by: FAMILY MEDICINE

## 2024-07-19 PROCEDURE — 84439 ASSAY OF FREE THYROXINE: CPT

## 2024-07-19 PROCEDURE — 3074F SYST BP LT 130 MM HG: CPT | Performed by: FAMILY MEDICINE

## 2024-07-19 PROCEDURE — 99214 OFFICE O/P EST MOD 30 MIN: CPT | Performed by: FAMILY MEDICINE

## 2024-07-19 PROCEDURE — 84443 ASSAY THYROID STIM HORMONE: CPT

## 2024-07-19 PROCEDURE — 36415 COLL VENOUS BLD VENIPUNCTURE: CPT

## 2024-07-19 RX ORDER — DULAGLUTIDE 1.5 MG/.5ML
1.5 INJECTION, SOLUTION SUBCUTANEOUS
Qty: 2 ML | Refills: 3 | Status: SHIPPED | OUTPATIENT
Start: 2024-07-21

## 2024-07-19 NOTE — PATIENT INSTRUCTIONS
Today we followed up on your Diabetes.     You are doing great!  Continue 5 servings of fresh fruits and veggies daily.  8 glasses of water daily.  30 minutes of exercise daily.  Continue your current medications.     Follow up in 4 months for your medication check    Today we followed up on your Thyroid medication - we will check your labs and adjust the medication accordingly.  Our goal will be to have a TSH between 2 and 3.  We will closely monitor your symptoms to determine the optimal dosing.

## 2024-07-19 NOTE — PROGRESS NOTES
Subjective   Patient ID: Levi Ramírez is a 34 y.o. female who presents for Prediabetes.    Last A1c as 6.0%.  She is tolerating trulicity.  Lost about 4 pounds.      She just had her gallbladder out with Dr. Dr. Saldaña and her reflux is gone and she is anxious to getting back to regular exercise habits.        She is also due to have her thyroid checked. Doing well just took her last dosage.           Review of Systems    Objective   /80   Pulse 93   Temp 36.5 °C (97.7 °F)   Resp 16   Wt 110 kg (242 lb)   SpO2 98%   BMI 42.87 kg/m²     Physical Exam  Vitals reviewed.   Constitutional:       Appearance: Normal appearance.   HENT:      Head: Normocephalic and atraumatic.      Right Ear: Tympanic membrane normal.      Left Ear: Tympanic membrane normal.      Mouth/Throat:      Mouth: Mucous membranes are moist.   Eyes:      Pupils: Pupils are equal, round, and reactive to light.   Cardiovascular:      Rate and Rhythm: Normal rate and regular rhythm.      Pulses:           Dorsalis pedis pulses are 2+ on the right side and 2+ on the left side.      Heart sounds: Normal heart sounds.   Pulmonary:      Effort: Pulmonary effort is normal.      Breath sounds: Normal breath sounds.   Musculoskeletal:      Cervical back: Normal range of motion.   Feet:      Right foot:      Skin integrity: Skin integrity normal.      Toenail Condition: Right toenails are normal.      Left foot:      Skin integrity: Skin integrity normal.      Toenail Condition: Left toenails are normal.   Skin:     General: Skin is dry.   Neurological:      General: No focal deficit present.      Mental Status: She is alert.         Assessment/Plan   Diagnoses and all orders for this visit:  Insulin resistance  -     dulaglutide (Trulicity) 1.5 mg/0.5 mL pen injector injection; Inject 1.5 mg under the skin 1 (one) time per week.  Type 2 diabetes mellitus without complication, unspecified whether long term insulin use (Multi)  -     dulaglutide  (Trulicity) 1.5 mg/0.5 mL pen injector injection; Inject 1.5 mg under the skin 1 (one) time per week.  -     POCT glycosylated hemoglobin (Hb A1C) manually resulted  Hypothyroidism, unspecified type  -     Thyroxine, Free; Future  -     Thyroid Stimulating Hormone; Future

## 2024-07-20 ENCOUNTER — TELEPHONE (OUTPATIENT)
Dept: PRIMARY CARE | Facility: CLINIC | Age: 34
End: 2024-07-20
Payer: MEDICARE

## 2024-07-20 DIAGNOSIS — E03.9 HYPOTHYROIDISM, UNSPECIFIED TYPE: Primary | ICD-10-CM

## 2024-07-20 RX ORDER — LEVOTHYROXINE SODIUM 100 UG/1
100 TABLET ORAL DAILY
Qty: 90 TABLET | Refills: 1 | Status: SHIPPED | OUTPATIENT
Start: 2024-07-20 | End: 2025-07-20

## 2024-07-23 LAB
LABORATORY COMMENT REPORT: NORMAL
PATH REPORT.FINAL DX SPEC: NORMAL
PATH REPORT.GROSS SPEC: NORMAL
PATH REPORT.TOTAL CANCER: NORMAL

## 2024-09-18 DIAGNOSIS — F41.9 ANXIETY: ICD-10-CM

## 2024-09-18 DIAGNOSIS — G47.00 INSOMNIA, UNSPECIFIED TYPE: ICD-10-CM

## 2024-09-18 RX ORDER — ESCITALOPRAM OXALATE 10 MG/1
10 TABLET ORAL DAILY
Qty: 90 TABLET | Refills: 1 | Status: SHIPPED | OUTPATIENT
Start: 2024-09-18

## 2024-09-18 RX ORDER — TRAZODONE HYDROCHLORIDE 50 MG/1
50 TABLET ORAL NIGHTLY PRN
Qty: 90 TABLET | Refills: 1 | Status: SHIPPED | OUTPATIENT
Start: 2024-09-18

## 2024-11-15 DIAGNOSIS — N94.6 DYSMENORRHEA: ICD-10-CM

## 2024-11-15 RX ORDER — DROSPIRENONE AND ETHINYL ESTRADIOL 0.02-3(28)
1 KIT ORAL DAILY
Qty: 84 TABLET | Refills: 0 | Status: SHIPPED | OUTPATIENT
Start: 2024-11-15

## 2025-01-16 DIAGNOSIS — F41.9 ANXIETY: ICD-10-CM

## 2025-01-16 DIAGNOSIS — N94.6 DYSMENORRHEA: ICD-10-CM

## 2025-01-16 RX ORDER — DROSPIRENONE AND ETHINYL ESTRADIOL 0.02-3(28)
1 KIT ORAL DAILY
Qty: 84 TABLET | Refills: 0 | Status: SHIPPED | OUTPATIENT
Start: 2025-01-16

## 2025-01-16 RX ORDER — ESCITALOPRAM OXALATE 10 MG/1
10 TABLET ORAL DAILY
Qty: 90 TABLET | Refills: 1 | Status: SHIPPED | OUTPATIENT
Start: 2025-01-16

## 2025-01-29 ENCOUNTER — APPOINTMENT (OUTPATIENT)
Facility: CLINIC | Age: 35
End: 2025-01-29
Payer: MEDICARE

## 2025-01-29 VITALS
TEMPERATURE: 98.2 F | HEIGHT: 63 IN | OXYGEN SATURATION: 98 % | SYSTOLIC BLOOD PRESSURE: 130 MMHG | DIASTOLIC BLOOD PRESSURE: 80 MMHG | HEART RATE: 96 BPM | WEIGHT: 247 LBS | RESPIRATION RATE: 16 BRPM | BODY MASS INDEX: 43.77 KG/M2

## 2025-01-29 DIAGNOSIS — R79.89 LOW VITAMIN B12 LEVEL: ICD-10-CM

## 2025-01-29 DIAGNOSIS — E88.819 INSULIN RESISTANCE: ICD-10-CM

## 2025-01-29 DIAGNOSIS — N94.6 DYSMENORRHEA: ICD-10-CM

## 2025-01-29 DIAGNOSIS — F41.9 ANXIETY: ICD-10-CM

## 2025-01-29 DIAGNOSIS — Z12.4 CERVICAL CANCER SCREENING: ICD-10-CM

## 2025-01-29 DIAGNOSIS — E04.9 ENLARGED THYROID: ICD-10-CM

## 2025-01-29 DIAGNOSIS — R73.03 PREDIABETES: ICD-10-CM

## 2025-01-29 DIAGNOSIS — F50.00 ANOREXIA NERVOSA IN REMISSION (MULTI): ICD-10-CM

## 2025-01-29 DIAGNOSIS — E03.9 HYPOTHYROIDISM, UNSPECIFIED TYPE: ICD-10-CM

## 2025-01-29 DIAGNOSIS — E11.9 TYPE 2 DIABETES MELLITUS WITHOUT COMPLICATION, UNSPECIFIED WHETHER LONG TERM INSULIN USE (MULTI): ICD-10-CM

## 2025-01-29 DIAGNOSIS — Z00.00 HEALTHCARE MAINTENANCE: Primary | ICD-10-CM

## 2025-01-29 PROBLEM — R12 HEARTBURN: Status: RESOLVED | Noted: 2023-08-25 | Resolved: 2025-01-29

## 2025-01-29 PROBLEM — Z04.9 CONDITION NOT FOUND: Status: RESOLVED | Noted: 2023-08-25 | Resolved: 2025-01-29

## 2025-01-29 PROBLEM — R73.9 HYPERGLYCEMIA: Status: RESOLVED | Noted: 2023-08-25 | Resolved: 2025-01-29

## 2025-01-29 PROBLEM — R10.84 GENERALIZED ABDOMINAL PAIN: Status: RESOLVED | Noted: 2023-08-25 | Resolved: 2025-01-29

## 2025-01-29 PROBLEM — E56.9 VITAMIN DEFICIENCY: Status: RESOLVED | Noted: 2023-02-20 | Resolved: 2025-01-29

## 2025-01-29 PROBLEM — R63.5 WEIGHT GAIN: Status: RESOLVED | Noted: 2023-08-25 | Resolved: 2025-01-29

## 2025-01-29 LAB
POC APPEARANCE, URINE: CLEAR
POC BILIRUBIN, URINE: NEGATIVE
POC BLOOD, URINE: NEGATIVE
POC COLOR, URINE: YELLOW
POC GLUCOSE, URINE: NEGATIVE MG/DL
POC KETONES, URINE: NEGATIVE MG/DL
POC LEUKOCYTES, URINE: NEGATIVE
POC NITRITE,URINE: NEGATIVE
POC PH, URINE: 5.5 PH
POC PROTEIN, URINE: NEGATIVE MG/DL
POC SPECIFIC GRAVITY, URINE: 1.02
POC UROBILINOGEN, URINE: 0.2 EU/DL

## 2025-01-29 PROCEDURE — 90715 TDAP VACCINE 7 YRS/> IM: CPT | Performed by: FAMILY MEDICINE

## 2025-01-29 PROCEDURE — 1036F TOBACCO NON-USER: CPT | Performed by: FAMILY MEDICINE

## 2025-01-29 PROCEDURE — 81002 URINALYSIS NONAUTO W/O SCOPE: CPT | Performed by: FAMILY MEDICINE

## 2025-01-29 PROCEDURE — 3075F SYST BP GE 130 - 139MM HG: CPT | Performed by: FAMILY MEDICINE

## 2025-01-29 PROCEDURE — 90471 IMMUNIZATION ADMIN: CPT | Performed by: FAMILY MEDICINE

## 2025-01-29 PROCEDURE — 3008F BODY MASS INDEX DOCD: CPT | Performed by: FAMILY MEDICINE

## 2025-01-29 PROCEDURE — 99213 OFFICE O/P EST LOW 20 MIN: CPT | Performed by: FAMILY MEDICINE

## 2025-01-29 PROCEDURE — 99395 PREV VISIT EST AGE 18-39: CPT | Performed by: FAMILY MEDICINE

## 2025-01-29 PROCEDURE — 3079F DIAST BP 80-89 MM HG: CPT | Performed by: FAMILY MEDICINE

## 2025-01-29 RX ORDER — ESCITALOPRAM OXALATE 10 MG/1
10 TABLET ORAL DAILY
Qty: 90 TABLET | Refills: 1 | Status: SHIPPED | OUTPATIENT
Start: 2025-01-29

## 2025-01-29 RX ORDER — METFORMIN HYDROCHLORIDE 500 MG/1
500 TABLET ORAL
Qty: 180 TABLET | Refills: 1 | Status: SHIPPED | OUTPATIENT
Start: 2025-01-29 | End: 2026-01-29

## 2025-01-29 RX ORDER — DROSPIRENONE AND ETHINYL ESTRADIOL 0.02-3(28)
1 KIT ORAL DAILY
Qty: 84 TABLET | Refills: 3 | Status: SHIPPED | OUTPATIENT
Start: 2025-01-29

## 2025-01-29 NOTE — PROGRESS NOTES
"Current Outpatient Medications   Medication Sig Dispense Refill    levothyroxine (Synthroid, Levoxyl) 100 mcg tablet Take 1 tablet (100 mcg) by mouth once daily. 90 tablet 1    traZODone (Desyrel) 50 mg tablet Take 1 tablet (50 mg) by mouth as needed at bedtime for sleep (start 1/2 pill at bedtime and may increase to 1 pill at bedtime if needed.). 90 tablet 1    drospirenone-ethinyl estradiol (Michelle) 3-0.02 mg tablet Take 1 tablet by mouth once daily. 84 tablet 3    escitalopram (Lexapro) 10 mg tablet Take 1 tablet (10 mg) by mouth once daily. 90 tablet 1    metFORMIN (Glucophage) 500 mg tablet Take 1 tablet (500 mg) by mouth 2 times daily (morning and late afternoon). 180 tablet 1     No current facility-administered medications for this visit.   Subjective   Patient ID: Levi Ramírez is a 34 y.o. female who presents for Annual Exam.    Dentist and Eye Doctor appointments: utd on eye doctor; due for dentist.    Immunizations: due for Tdap  Diet: trying to eat healthy  Exercise: trying to do some walking and minimal yoga; likes to bike  Supplements: fish oil  Menstrual Cycles:regular  Sexually Active: Not currently  Pregnancy History:g0  Cancer Screening:    Cervical: 2022 - wnl negative hpv   Breast:  n/a   Colon: n/a   Skin: out in the sun wears sunscreen   DEXA: n/a          Wants to try metformin again - doesn't like sticking herself weekly with the [facility]         Review of Systems    Objective   /80   Pulse 96   Temp 36.8 °C (98.2 °F)   Resp 16   Ht 1.6 m (5' 3\")   Wt 112 kg (247 lb)   LMP 01/15/2025 (Approximate) Comment: last pap 3/2022  SpO2 98%   BMI 43.75 kg/m²     Physical Exam  Constitutional:       General: She is not in acute distress.     Appearance: She is well-developed. She is not diaphoretic.   HENT:      Head: Normocephalic and atraumatic.      Right Ear: Tympanic membrane normal.      Left Ear: Tympanic membrane normal.      Nose: Nose normal.      Mouth/Throat:      Mouth: Mucous " membranes are moist.   Eyes:      General: No scleral icterus.     Pupils: Pupils are equal, round, and reactive to light.   Neck:      Thyroid: No thyromegaly.      Vascular: No JVD.   Cardiovascular:      Rate and Rhythm: Normal rate and regular rhythm.      Heart sounds: Normal heart sounds. No murmur heard.     No friction rub. No gallop.   Pulmonary:      Effort: Pulmonary effort is normal. No respiratory distress.      Breath sounds: Normal breath sounds. No wheezing or rales.   Chest:      Chest wall: No tenderness.   Abdominal:      General: Bowel sounds are normal. There is no distension.      Palpations: Abdomen is soft. There is no mass.      Tenderness: There is no abdominal tenderness. There is no rebound.   Musculoskeletal:         General: Normal range of motion.      Cervical back: Normal range of motion and neck supple.   Lymphadenopathy:      Cervical: No cervical adenopathy.   Skin:     General: Skin is warm and dry.   Neurological:      General: No focal deficit present.      Mental Status: She is alert and oriented to person, place, and time.      Deep Tendon Reflexes: Reflexes normal.   Psychiatric:         Mood and Affect: Mood normal.         Behavior: Behavior normal.         Assessment/Plan   Diagnoses and all orders for this visit:  Healthcare maintenance  -     CBC; Future  -     Comprehensive Metabolic Panel; Future  -     Lipid Panel; Future  -     Vitamin D 25 hydroxy; Future  Prediabetes  -     Hemoglobin A1C; Future  -     Albumin-Creatinine Ratio, Urine Random; Future  -     metFORMIN (Glucophage) 500 mg tablet; Take 1 tablet (500 mg) by mouth 2 times daily (morning and late afternoon).  Insulin resistance  -     metFORMIN (Glucophage) 500 mg tablet; Take 1 tablet (500 mg) by mouth 2 times daily (morning and late afternoon).  Cervical cancer screening  Comments:  2022 - wnl negative hpv due in 2027  Hypothyroidism, unspecified type  -     Thyroxine, Free; Future  -     Thyroid  Stimulating Hormone; Future  Dysmenorrhea  -     drospirenone-ethinyl estradiol (Michelle) 3-0.02 mg tablet; Take 1 tablet by mouth once daily.  Anxiety  -     escitalopram (Lexapro) 10 mg tablet; Take 1 tablet (10 mg) by mouth once daily.  Low vitamin B12 level  -     Vitamin B12; Future  Enlarged thyroid  -     US thyroid; Future  Type 2 diabetes mellitus without complication, unspecified whether long term insulin use (Multi)  Anorexia nervosa in remission (Multi)  Other orders  -     Tdap vaccine, age 7 years and older  (BOOSTRIX)

## 2025-01-29 NOTE — PATIENT INSTRUCTIONS
Today we performed your Annual Physical Exam.  Your preventative health care, labs and vaccinations have been reviewed and are up to date.      We will stop trulicity due to not wanting to give yourself injections any longer, and restart metformin - Follow up in 4 months for medication check     You were given a Tdap vaccine today and this is good for 10 years.     Follow up in 1 year for your Complete Physical Exam

## 2025-01-30 LAB
ALBUMIN/CREAT UR: 5 MG/G CREAT
CREAT UR-MCNC: 148 MG/DL (ref 20–275)
MICROALBUMIN UR-MCNC: 0.7 MG/DL

## 2025-02-10 ENCOUNTER — HOSPITAL ENCOUNTER (OUTPATIENT)
Dept: RADIOLOGY | Facility: HOSPITAL | Age: 35
Discharge: HOME | End: 2025-02-10
Payer: MEDICARE

## 2025-02-10 DIAGNOSIS — E04.9 ENLARGED THYROID: ICD-10-CM

## 2025-02-10 PROCEDURE — 76536 US EXAM OF HEAD AND NECK: CPT | Performed by: RADIOLOGY

## 2025-02-10 PROCEDURE — 76536 US EXAM OF HEAD AND NECK: CPT

## 2025-02-11 ENCOUNTER — TELEPHONE (OUTPATIENT)
Facility: CLINIC | Age: 35
End: 2025-02-11
Payer: MEDICARE

## 2025-02-11 DIAGNOSIS — E11.9 TYPE 2 DIABETES MELLITUS WITHOUT COMPLICATION, UNSPECIFIED WHETHER LONG TERM INSULIN USE (MULTI): ICD-10-CM

## 2025-02-11 DIAGNOSIS — R79.89 ELEVATED LFTS: ICD-10-CM

## 2025-02-11 DIAGNOSIS — E55.9 HYPOVITAMINOSIS D: Primary | ICD-10-CM

## 2025-02-11 DIAGNOSIS — K76.0 FATTY LIVER: ICD-10-CM

## 2025-02-11 LAB
25(OH)D3+25(OH)D2 SERPL-MCNC: 17 NG/ML (ref 30–100)
ALBUMIN SERPL-MCNC: 3.9 G/DL (ref 3.6–5.1)
ALP SERPL-CCNC: 46 U/L (ref 31–125)
ALT SERPL-CCNC: 31 U/L (ref 6–29)
ANION GAP SERPL CALCULATED.4IONS-SCNC: 13 MMOL/L (CALC) (ref 7–17)
AST SERPL-CCNC: 42 U/L (ref 10–30)
BILIRUB SERPL-MCNC: 0.3 MG/DL (ref 0.2–1.2)
BUN SERPL-MCNC: 14 MG/DL (ref 7–25)
CALCIUM SERPL-MCNC: 9.2 MG/DL (ref 8.6–10.2)
CHLORIDE SERPL-SCNC: 102 MMOL/L (ref 98–110)
CHOLEST SERPL-MCNC: 162 MG/DL
CHOLEST/HDLC SERPL: 2.7 (CALC)
CO2 SERPL-SCNC: 22 MMOL/L (ref 20–32)
CREAT SERPL-MCNC: 0.86 MG/DL (ref 0.5–0.97)
EGFRCR SERPLBLD CKD-EPI 2021: 92 ML/MIN/1.73M2
ERYTHROCYTE [DISTWIDTH] IN BLOOD BY AUTOMATED COUNT: 12.5 % (ref 11–15)
EST. AVERAGE GLUCOSE BLD GHB EST-MCNC: 134 MG/DL
EST. AVERAGE GLUCOSE BLD GHB EST-SCNC: 7.4 MMOL/L
GLUCOSE SERPL-MCNC: 114 MG/DL (ref 65–99)
HBA1C MFR BLD: 6.3 % OF TOTAL HGB
HCT VFR BLD AUTO: 37.5 % (ref 35–45)
HDLC SERPL-MCNC: 59 MG/DL
HGB BLD-MCNC: 12.7 G/DL (ref 11.7–15.5)
LDLC SERPL CALC-MCNC: 66 MG/DL (CALC)
MCH RBC QN AUTO: 31.1 PG (ref 27–33)
MCHC RBC AUTO-ENTMCNC: 33.9 G/DL (ref 32–36)
MCV RBC AUTO: 91.7 FL (ref 80–100)
NONHDLC SERPL-MCNC: 103 MG/DL (CALC)
PLATELET # BLD AUTO: 338 THOUSAND/UL (ref 140–400)
PMV BLD REES-ECKER: 9.3 FL (ref 7.5–12.5)
POTASSIUM SERPL-SCNC: 4.5 MMOL/L (ref 3.5–5.3)
PROT SERPL-MCNC: 7.1 G/DL (ref 6.1–8.1)
RBC # BLD AUTO: 4.09 MILLION/UL (ref 3.8–5.1)
SODIUM SERPL-SCNC: 137 MMOL/L (ref 135–146)
T4 FREE SERPL-MCNC: 1.2 NG/DL (ref 0.8–1.8)
TRIGL SERPL-MCNC: 283 MG/DL
TSH SERPL-ACNC: 2.46 MIU/L
VIT B12 SERPL-MCNC: 305 PG/ML (ref 200–1100)
WBC # BLD AUTO: 8 THOUSAND/UL (ref 3.8–10.8)

## 2025-02-11 RX ORDER — ERGOCALCIFEROL 1.25 MG/1
50000 CAPSULE ORAL
Qty: 12 CAPSULE | Refills: 0 | Status: SHIPPED | OUTPATIENT
Start: 2025-02-16 | End: 2025-05-11

## 2025-02-11 RX ORDER — METFORMIN HYDROCHLORIDE 1000 MG/1
1000 TABLET ORAL
Qty: 180 TABLET | Refills: 1 | Status: SHIPPED | OUTPATIENT
Start: 2025-02-11 | End: 2026-02-11

## 2025-02-16 ENCOUNTER — PATIENT MESSAGE (OUTPATIENT)
Facility: CLINIC | Age: 35
End: 2025-02-16
Payer: MEDICARE

## 2025-03-17 ENCOUNTER — OFFICE VISIT (OUTPATIENT)
Dept: GASTROENTEROLOGY | Facility: CLINIC | Age: 35
End: 2025-03-17
Payer: MEDICARE

## 2025-03-17 VITALS
SYSTOLIC BLOOD PRESSURE: 148 MMHG | HEART RATE: 112 BPM | DIASTOLIC BLOOD PRESSURE: 91 MMHG | BODY MASS INDEX: 42.87 KG/M2 | WEIGHT: 242 LBS | TEMPERATURE: 96.4 F

## 2025-03-17 DIAGNOSIS — K76.0 FATTY LIVER: ICD-10-CM

## 2025-03-17 PROCEDURE — 99214 OFFICE O/P EST MOD 30 MIN: CPT | Performed by: NURSE PRACTITIONER

## 2025-03-17 PROCEDURE — 3080F DIAST BP >= 90 MM HG: CPT | Performed by: NURSE PRACTITIONER

## 2025-03-17 PROCEDURE — 99204 OFFICE O/P NEW MOD 45 MIN: CPT | Performed by: NURSE PRACTITIONER

## 2025-03-17 PROCEDURE — 3077F SYST BP >= 140 MM HG: CPT | Performed by: NURSE PRACTITIONER

## 2025-03-17 PROCEDURE — 1036F TOBACCO NON-USER: CPT | Performed by: NURSE PRACTITIONER

## 2025-03-17 ASSESSMENT — ENCOUNTER SYMPTOMS
DYSURIA: 0
VOICE CHANGE: 0
NUMBNESS: 0
CHILLS: 0
TREMORS: 0
NAUSEA: 0
DIFFICULTY URINATING: 0
DIARRHEA: 0
SLEEP DISTURBANCE: 0
JOINT SWELLING: 0
BLOOD IN STOOL: 0
TROUBLE SWALLOWING: 0
ABDOMINAL PAIN: 0
AGITATION: 0
HEMATURIA: 0
LIGHT-HEADEDNESS: 0
APPETITE CHANGE: 0
WHEEZING: 0
BRUISES/BLEEDS EASILY: 0
HEADACHES: 0
DIZZINESS: 0
CONSTIPATION: 0
VOMITING: 0
SHORTNESS OF BREATH: 0
FREQUENCY: 0
ABDOMINAL DISTENTION: 0
UNEXPECTED WEIGHT CHANGE: 0
COUGH: 0
WEAKNESS: 0
PALPITATIONS: 0
CONFUSION: 0
FEVER: 0
DEPRESSION: 0
COLOR CHANGE: 0

## 2025-03-17 ASSESSMENT — PAIN SCALES - GENERAL: PAINLEVEL_OUTOF10: 0-NO PAIN

## 2025-03-17 NOTE — ASSESSMENT & PLAN NOTE
34 year old with elevated liver enzymes and imaging consistent with fatty liver disease.  Risk factors include PCOS, hypothyroidism and obesity.  Asymptomatic.    Discussed natural history of fatty liver including risk factors and management.  Exercise/Activity  Vigorous physical activity like brisk walking or structured gym exercises 3 times a week for 30 minutes at minimum.    Resistance training to build muscle mass which will aid in weight loss.  Swimming, water aerobics are excellent forms of exercises that are easy on joints.    Exercise for 30 minutes or more at least 3 times a week  Aim to lose 7-10% of your total body weight over 6-12 months  Diet  Avoid/Limit simple carbs including simple sugars  Avoid eating foods that are rich in sugar in excess such as high sugar content fruits (pineapple, sulaiman...) and desserts, cakes and pies  Eat more good foods that are rich in good fat such as cold water fish (salmon, swordfish...) as well as avocados and peanut butter in moderation  Snack on nuts that are high in protein and good oils such as walnuts, almonds.   Eat a mediteranean diet which is rich in grilled lean meats, high protein beans and legumes and olive oil.          Will complete fibroscan to grade and stage fatty liver.  Based on these findings, will determine follow-up and treatment course, which could include new drug for VIVEROS.

## 2025-03-17 NOTE — PROGRESS NOTES
Patient ID: Levi Ramírez is a 34 y.o. female who presents for fatty liver.    HPI  34 year old with PCOS, hypothyroidism and obesity referred for elevated liver enzymes.  She has had elevated liver enzymes for a couple years and imaging confirming fatty liver in 2024.  Pt states she had an increase in her Metformin over the summer and as a result she dropped some weight.   Her liver enzymes have improved from July, though still slightly elevated.  No liver dysfunction  Trying to eat better, not exercising much.  Asymptomatic.    No liver disease in the family.  Mother has HTN and DM2.    ETOH-socially 4-5 times per year.  No history of illict use.    US of abdomen 03/2024:  Enlarged liver with diffuse fatty infiltration.     Review of Systems   Constitutional:  Negative for appetite change, chills, fever and unexpected weight change.   HENT:  Negative for mouth sores, nosebleeds, trouble swallowing and voice change.    Eyes:  Negative for visual disturbance.   Respiratory:  Negative for cough, shortness of breath and wheezing.    Cardiovascular:  Negative for chest pain, palpitations and leg swelling.   Gastrointestinal:  Negative for abdominal distention, abdominal pain, blood in stool, constipation, diarrhea, nausea and vomiting.   Genitourinary:  Negative for decreased urine volume, difficulty urinating, dysuria, frequency, hematuria and urgency.   Musculoskeletal:  Negative for gait problem and joint swelling.   Skin:  Negative for color change, pallor and rash.   Neurological:  Negative for dizziness, tremors, weakness, light-headedness, numbness and headaches.   Hematological:  Does not bruise/bleed easily.   Psychiatric/Behavioral:  Negative for agitation, behavioral problems, confusion and sleep disturbance.        Objective   Physical Exam  Constitutional:       General: She is awake.      Appearance: Normal appearance. She is well-developed.   HENT:      Head: Normocephalic and atraumatic.      Right  Ear: Hearing normal.      Left Ear: Hearing normal.      Nose: Nose normal.      Mouth/Throat:      Lips: Pink.      Mouth: Mucous membranes are moist.   Eyes:      General: Lids are normal.      Extraocular Movements: Extraocular movements intact.      Conjunctiva/sclera: Conjunctivae normal.      Pupils: Pupils are equal, round, and reactive to light.   Cardiovascular:      Rate and Rhythm: Normal rate and regular rhythm.      Pulses: Normal pulses.      Heart sounds: Normal heart sounds.   Pulmonary:      Effort: Pulmonary effort is normal.      Breath sounds: Normal breath sounds.   Abdominal:      General: Abdomen is flat. Bowel sounds are normal.      Palpations: Abdomen is soft.   Musculoskeletal:      Cervical back: Normal range of motion and neck supple.   Feet:      Right foot:      Skin integrity: Skin integrity normal.      Left foot:      Skin integrity: Skin integrity normal.   Skin:     General: Skin is warm.   Neurological:      General: No focal deficit present.      Mental Status: She is alert and oriented to person, place, and time.      Cranial Nerves: Cranial nerves 2-12 are intact.      Sensory: Sensation is intact.      Motor: Motor function is intact.      Coordination: Coordination is intact.      Gait: Gait is intact.   Psychiatric:         Attention and Perception: Attention and perception normal.         Mood and Affect: Mood normal.         Speech: Speech normal.         Behavior: Behavior is cooperative.         Thought Content: Thought content normal.         Cognition and Memory: Cognition normal.         Judgment: Judgment normal.         Current Outpatient Medications   Medication Sig Dispense Refill    drospirenone-ethinyl estradiol (Michelle) 3-0.02 mg tablet Take 1 tablet by mouth once daily. 84 tablet 3    ergocalciferol (Vitamin D-2) 1.25 MG (21586 UT) capsule Take 1 capsule (50,000 Units) by mouth 1 (one) time per week. 12 capsule 0    escitalopram (Lexapro) 10 mg tablet Take 1  tablet (10 mg) by mouth once daily. 90 tablet 1    levothyroxine (Synthroid, Levoxyl) 100 mcg tablet Take 1 tablet (100 mcg) by mouth once daily. 90 tablet 1    metFORMIN (Glucophage) 1,000 mg tablet Take 1 tablet (1,000 mg) by mouth 2 times daily (morning and late afternoon). 180 tablet 1    traZODone (Desyrel) 50 mg tablet Take 1 tablet (50 mg) by mouth as needed at bedtime for sleep (start 1/2 pill at bedtime and may increase to 1 pill at bedtime if needed.). 90 tablet 1     No current facility-administered medications for this visit.     LABS:   Lab Results   Component Value Date    ALBUMIN 3.9 02/10/2025    BILITOT 0.3 02/10/2025    BILIDIR 0.1 07/03/2024    ALKPHOS 46 02/10/2025    ALT 31 (H) 02/10/2025    AST 42 (H) 02/10/2025    PROT 7.1 02/10/2025      Lab Results   Component Value Date    WBC 8.0 02/10/2025    HGB 12.7 02/10/2025    HCT 37.5 02/10/2025    MCV 91.7 02/10/2025     02/10/2025       === 02/10/25 ===    US THYROID    - Impression -  Unremarkable thyroid ultrasound.    MACRO:  None.    Signed by: Iain Manzano 2/10/2025 5:34 PM  Dictation workstation:   [id number]     Assessment/Plan   Problem List Items Addressed This Visit             ICD-10-CM    Fatty liver K76.0     34 year old with elevated liver enzymes and imaging consistent with fatty liver disease.  Risk factors include PCOS, hypothyroidism and obesity.  Asymptomatic.    Discussed natural history of fatty liver including risk factors and management.  Exercise/Activity  Vigorous physical activity like brisk walking or structured gym exercises 3 times a week for 30 minutes at minimum.    Resistance training to build muscle mass which will aid in weight loss.  Swimming, water aerobics are excellent forms of exercises that are easy on joints.    Exercise for 30 minutes or more at least 3 times a week  Aim to lose 7-10% of your total body weight over 6-12 months  Diet  Avoid/Limit simple carbs including simple sugars  Avoid  eating foods that are rich in sugar in excess such as high sugar content fruits (pineapple, sulaiman...) and desserts, cakes and pies  Eat more good foods that are rich in good fat such as cold water fish (salmon, swordfish...) as well as avocados and peanut butter in moderation  Snack on nuts that are high in protein and good oils such as walnuts, almonds.   Eat a mediteranean diet which is rich in grilled lean meats, high protein beans and legumes and olive oil.          Will complete fibroscan to grade and stage fatty liver.  Based on these findings, will determine follow-up and treatment course, which could include new drug for VIVEROS.              Relevant Orders    Liver Elastography (Fibroscan)    [facility] MISCELLANEOUS TEST (REFRIGERATED)    FIDELIA with Reflex to THERESA    Anti-Mitochondrial Antibody    Anti-Smooth Muscle Antibody    CBC and Auto Differential    Comprehensive Metabolic Panel    Ferritin    Iron and TIBC    Immunoglobulins (IgG, IgA, IgM)            PHILLY Maher 03/17/25 10:56 AM

## 2025-03-19 DIAGNOSIS — E03.9 HYPOTHYROIDISM, UNSPECIFIED TYPE: ICD-10-CM

## 2025-03-19 DIAGNOSIS — G47.00 INSOMNIA, UNSPECIFIED TYPE: ICD-10-CM

## 2025-03-19 RX ORDER — TRAZODONE HYDROCHLORIDE 50 MG/1
TABLET ORAL
Qty: 90 TABLET | Refills: 1 | Status: SHIPPED | OUTPATIENT
Start: 2025-03-19

## 2025-03-19 RX ORDER — LEVOTHYROXINE SODIUM 100 UG/1
100 TABLET ORAL DAILY
Qty: 90 TABLET | Refills: 0 | Status: SHIPPED | OUTPATIENT
Start: 2025-03-19

## 2025-03-21 LAB
ALBUMIN SERPL-MCNC: 4.3 G/DL (ref 3.6–5.1)
ALP SERPL-CCNC: 45 U/L (ref 31–125)
ALT SERPL-CCNC: 65 U/L (ref 6–29)
ANA PAT SER IF-IMP: ABNORMAL
ANA PAT SER IF-IMP: ABNORMAL
ANA SER QL IF: POSITIVE
ANA TITR SER IF: ABNORMAL TITER
ANA TITR SER IF: ABNORMAL TITER
ANION GAP SERPL CALCULATED.4IONS-SCNC: 12 MMOL/L (CALC) (ref 7–17)
AST SERPL-CCNC: 70 U/L (ref 10–30)
BASOPHILS # BLD AUTO: 51 CELLS/UL (ref 0–200)
BASOPHILS NFR BLD AUTO: 0.7 %
BILIRUB SERPL-MCNC: 0.3 MG/DL (ref 0.2–1.2)
BUN SERPL-MCNC: 13 MG/DL (ref 7–25)
CALCIUM SERPL-MCNC: 9.9 MG/DL (ref 8.6–10.2)
CENTROMERE B AB SER-ACNC: ABNORMAL AI
CHLORIDE SERPL-SCNC: 104 MMOL/L (ref 98–110)
CO2 SERPL-SCNC: 25 MMOL/L (ref 20–32)
CREAT SERPL-MCNC: 0.96 MG/DL (ref 0.5–0.97)
DSDNA AB SER-ACNC: 1 IU/ML
EGFRCR SERPLBLD CKD-EPI 2021: 81 ML/MIN/1.73M2
ENA JO1 AB SER IA-ACNC: ABNORMAL AI
ENA RNP AB SER-ACNC: ABNORMAL AI
ENA SCL70 AB SER IA-ACNC: ABNORMAL AI
ENA SM AB SER IA-ACNC: ABNORMAL AI
ENA SM+RNP AB SER IA-ACNC: ABNORMAL AI
ENA SS-A AB SER IA-ACNC: ABNORMAL AI
ENA SS-B AB SER IA-ACNC: ABNORMAL AI
EOSINOPHIL # BLD AUTO: 511 CELLS/UL (ref 15–500)
EOSINOPHIL NFR BLD AUTO: 7 %
ERYTHROCYTE [DISTWIDTH] IN BLOOD BY AUTOMATED COUNT: 12.7 % (ref 11–15)
FERRITIN SERPL-MCNC: 83 NG/ML (ref 16–154)
GLUCOSE SERPL-MCNC: 96 MG/DL (ref 65–99)
HCT VFR BLD AUTO: 40.6 % (ref 35–45)
HGB BLD-MCNC: 13.7 G/DL (ref 11.7–15.5)
IGA SERPL-MCNC: 262 MG/DL (ref 47–310)
IGG SERPL-MCNC: 1178 MG/DL (ref 600–1640)
IGM SERPL-MCNC: 84 MG/DL (ref 50–300)
IRON SATN MFR SERPL: 19 % (CALC) (ref 16–45)
IRON SERPL-MCNC: 107 MCG/DL (ref 40–190)
LABORATORY COMMENT REPORT: ABNORMAL
LYMPHOCYTES # BLD AUTO: 2460 CELLS/UL (ref 850–3900)
LYMPHOCYTES NFR BLD AUTO: 33.7 %
MCH RBC QN AUTO: 31.1 PG (ref 27–33)
MCHC RBC AUTO-ENTMCNC: 33.7 G/DL (ref 32–36)
MCV RBC AUTO: 92.1 FL (ref 80–100)
MITOCHONDRIA AB SER QL IF: NEGATIVE
MONOCYTES # BLD AUTO: 467 CELLS/UL (ref 200–950)
MONOCYTES NFR BLD AUTO: 6.4 %
NEUTROPHILS # BLD AUTO: 3811 CELLS/UL (ref 1500–7800)
NEUTROPHILS NFR BLD AUTO: 52.2 %
NUCLEOSOME AB SER IA-ACNC: ABNORMAL AI
PLATELET # BLD AUTO: 410 THOUSAND/UL (ref 140–400)
PMV BLD REES-ECKER: 9.7 FL (ref 7.5–12.5)
POTASSIUM SERPL-SCNC: 4.5 MMOL/L (ref 3.5–5.3)
PROT SERPL-MCNC: 7.5 G/DL (ref 6.1–8.1)
QUEST ENHANCED LIVER FIBROSIS (ELF) SCORE: 10.43
RBC # BLD AUTO: 4.41 MILLION/UL (ref 3.8–5.1)
RIBOSOMAL P AB SER-ACNC: ABNORMAL AI
SMOOTH MUSCLE AB SER QL IF: NEGATIVE
SODIUM SERPL-SCNC: 141 MMOL/L (ref 135–146)
TIBC SERPL-MCNC: 554 MCG/DL (CALC) (ref 250–450)
WBC # BLD AUTO: 7.3 THOUSAND/UL (ref 3.8–10.8)

## 2025-05-09 DIAGNOSIS — E55.9 HYPOVITAMINOSIS D: ICD-10-CM

## 2025-05-12 RX ORDER — ERGOCALCIFEROL 1.25 MG/1
1.25 CAPSULE ORAL
Qty: 12 CAPSULE | Refills: 0 | OUTPATIENT
Start: 2025-05-18

## 2025-06-09 ENCOUNTER — CLINICAL SUPPORT (OUTPATIENT)
Dept: GASTROENTEROLOGY | Facility: CLINIC | Age: 35
End: 2025-06-09
Payer: MEDICARE

## 2025-06-09 DIAGNOSIS — K76.0 FATTY LIVER: ICD-10-CM

## 2025-06-09 PROCEDURE — 91200 LIVER ELASTOGRAPHY: CPT | Performed by: INTERNAL MEDICINE

## 2025-06-30 ENCOUNTER — APPOINTMENT (OUTPATIENT)
Facility: CLINIC | Age: 35
End: 2025-06-30
Payer: MEDICARE

## 2025-06-30 VITALS
TEMPERATURE: 98 F | DIASTOLIC BLOOD PRESSURE: 64 MMHG | HEART RATE: 79 BPM | RESPIRATION RATE: 16 BRPM | OXYGEN SATURATION: 100 % | SYSTOLIC BLOOD PRESSURE: 110 MMHG | BODY MASS INDEX: 37.91 KG/M2 | WEIGHT: 214 LBS

## 2025-06-30 DIAGNOSIS — E28.2 PCOS (POLYCYSTIC OVARIAN SYNDROME): ICD-10-CM

## 2025-06-30 DIAGNOSIS — E88.819 INSULIN RESISTANCE: ICD-10-CM

## 2025-06-30 DIAGNOSIS — E11.9 TYPE 2 DIABETES MELLITUS WITHOUT COMPLICATION, UNSPECIFIED WHETHER LONG TERM INSULIN USE: Primary | ICD-10-CM

## 2025-06-30 DIAGNOSIS — R79.89 ELEVATED LFTS: ICD-10-CM

## 2025-06-30 DIAGNOSIS — K76.0 FATTY LIVER: ICD-10-CM

## 2025-06-30 DIAGNOSIS — E03.9 HYPOTHYROIDISM, UNSPECIFIED TYPE: ICD-10-CM

## 2025-06-30 LAB — POC HEMOGLOBIN A1C: 5.7 % (ref 4.2–6.5)

## 2025-06-30 PROCEDURE — 3044F HG A1C LEVEL LT 7.0%: CPT | Performed by: FAMILY MEDICINE

## 2025-06-30 PROCEDURE — 83036 HEMOGLOBIN GLYCOSYLATED A1C: CPT | Performed by: FAMILY MEDICINE

## 2025-06-30 PROCEDURE — 3074F SYST BP LT 130 MM HG: CPT | Performed by: FAMILY MEDICINE

## 2025-06-30 PROCEDURE — 99214 OFFICE O/P EST MOD 30 MIN: CPT | Performed by: FAMILY MEDICINE

## 2025-06-30 PROCEDURE — 3078F DIAST BP <80 MM HG: CPT | Performed by: FAMILY MEDICINE

## 2025-06-30 RX ORDER — METFORMIN HYDROCHLORIDE 500 MG/1
500 TABLET ORAL
Qty: 180 TABLET | Refills: 1 | Status: SHIPPED | OUTPATIENT
Start: 2025-06-30 | End: 2026-06-30

## 2025-06-30 NOTE — PROGRESS NOTES
Subjective   Patient ID: Levi Ramírez is a 35 y.o. female who presents for Diabetes, Anxiety, and Hypothyroidism.  She has lost 30 pounds she tried low carb and she is moving a lot - walking, hiking and lifting.    She was treated for fatty liver by GI and was diagnosed with stage 1.  She was told to lose 20 pounds.    She is feeling good on the lexapro and she is no longer seeing a counselor.  She is doing really well.      Diabetes    Anxiety            Review of Systems    Current Outpatient Medications   Medication Instructions    drospirenone-ethinyl estradiol (Michelle) 3-0.02 mg tablet 1 tablet, oral, Daily    escitalopram (LEXAPRO) 10 mg, oral, Daily    levothyroxine (SYNTHROID, LEVOXYL) 100 mcg, oral, Daily    metFORMIN (GLUCOPHAGE) 500 mg, oral, 2 times daily (morning and late afternoon)    traZODone (Desyrel) 50 mg tablet TAKE ONE TABLET BY MOUTH AT BEDTIME AS NEEDED FOR SLEEP (START WITH 1/2 TABLET AT BEDTIME AND MAY INCREASE TO 1 TABLET IF NEEDED.)       RX Allergies[1]  Amoxicillin    Past Medical History:  08/25/2023: Abnormal creatine kinase level  08/25/2023: Anorexia nervosa in remission (Multi)  No date: Anxiety disorder, unspecified      Comment:  Anxiety and depression  04/01/2024: Calculus of gallbladder without cholecystitis without   obstruction  01/29/2025: Cervical cancer screening  08/25/2023: Chronic constipation  08/25/2023: Chronic pain in female pelvis  08/25/2023: Developmental concern  08/25/2023: Dysmenorrhea  02/20/2023: Episode of recurrent major depressive disorder      Comment:  Last Assessment & Plan: Formatting of this note might be               different from the original. Condition: stable Last                mental health visit 02/14/2023 Medications: Taking                medications as prescribed If taking medications, do not                stop treatment without consulting healthcare provider. If               symptoms worsen or do not improve/stabilize, notify                 health care provider right away. If thoughts of harming                self or others not  08/25/2023: Fibrocystic breast changes  07/12/2023: Generalized anxiety disorder      Comment:  Anxiety and depression    10/20/2021: GERD (gastroesophageal reflux disease)      Comment:  Last Assessment & Plan: Formatting of this note might be               different from the original. Condition: stable Reviewed                use of antacid medication and/or diet modifications of                decreasing caffeine, spicy foods, chocolate, and avoiding               alcohol, tobacco, NSAIDs, and reducing citrus acids.                Follow up in: three months    No date: Headache, unspecified      Comment:  Generalized headaches  No date: Hypothyroid  02/20/2023: Hypothyroidism      Comment:  Last Assessment & Plan: Formatting of this note might be               different from the original. Condition: stable Levi                 reports compliance with prescribed medication. Levi                denies the following symptoms: weight gain, bradycardia,                fatigue, developing coarse or thin hair, cold intolerance               or constipation etc. Member encouraged to keep all                scheduled appointments. Follow up in: three months    10/20/2021: IBS (irritable bowel syndrome)      Comment:  Last Assessment & Plan: Formatting of this note might be               different from the original. Condition: stable . Pt                reports she currently managing IBS by diet. Pt to notify                provider if IBS symptoms get worse or begin to disrupt                your day-to-day life,  become more tired than usual, or                home treatment stops working. Follow up in: three months   08/25/2023: Insulin resistance  08/25/2023: Low vitamin B12 level  02/20/2023: Morbid (severe) obesity due to excess calories (Multi)      Comment:  Last Assessment & Plan: Formatting of this note is                 different from the original. Condition: stable                Co-morbidities: N/A BMI > 40, GERD and prediabetes.                Discussed with Levi the need to lose weight to reduce                their risk of a stroke, myocardial infarction or death.                Explained to her their risks for those life changing                events increases with their existing comorbidity of                hypertension/hyperlipidemia/ diabe  08/10/2004: Other acne  02/20/2023: PCOS (polycystic ovarian syndrome)      Comment:  Last Assessment & Plan: Formatting of this note might be               different from the original. Condition: stable Discussed                with Levi to continue current medication regimen as                advised from PCP. Follow up in: three months    No date: Pre-diabetes  02/20/2023: Prediabetes      Comment:  Last Assessment & Plan: Formatting of this note might be               different from the original. Condition: stable Discussed                 with Levi the importance of lifestyle and behavioral                modifications such as proper nutrition and hydration,                adherence to medication regimen, proper foot care, and                dietary exercise as recommended by the PCP. Follow up in:               three months    08/25/2023: PTSD (post-traumatic stress disorder)  No date: Seasonal allergies    Social History     Tobacco Use    Smoking status: Former     Types: Cigarettes    Smokeless tobacco: Never    Tobacco comments:     5 years on and off   Substance Use Topics    Alcohol use: Yes     Comment: socially       Objective     Vitals:    06/30/25 0754   BP: 110/64   Pulse: 79   Resp: 16   Temp: 36.7 °C (98 °F)   SpO2: 100%   Weight: 97.1 kg (214 lb)     No LMP recorded. (Menstrual status: OCP).    Physical Exam  Vitals reviewed.   Constitutional:       Appearance: Normal appearance.   HENT:      Head: Normocephalic and atraumatic.      Right Ear: Tympanic membrane  normal.      Left Ear: Tympanic membrane normal.      Mouth/Throat:      Mouth: Mucous membranes are moist.   Eyes:      Pupils: Pupils are equal, round, and reactive to light.   Cardiovascular:      Rate and Rhythm: Normal rate and regular rhythm.      Pulses:           Dorsalis pedis pulses are 2+ on the right side and 2+ on the left side.      Heart sounds: Normal heart sounds.   Pulmonary:      Effort: Pulmonary effort is normal.      Breath sounds: Normal breath sounds.   Musculoskeletal:      Cervical back: Normal range of motion.   Feet:      Right foot:      Skin integrity: Skin integrity normal.      Toenail Condition: Right toenails are normal.      Left foot:      Skin integrity: Skin integrity normal.      Toenail Condition: Left toenails are normal.   Skin:     General: Skin is dry.   Neurological:      General: No focal deficit present.      Mental Status: She is alert.         Assessment/Plan   Diagnoses and all orders for this visit:  Type 2 diabetes mellitus without complication, unspecified whether long term insulin use  -     POCT glycosylated hemoglobin (Hb A1C) manually resulted  -     metFORMIN (Glucophage) 500 mg tablet; Take 1 tablet (500 mg) by mouth 2 times daily (morning and late afternoon).  -     Comprehensive Metabolic Panel; Future  -     Lipid Panel; Future  Elevated LFTs  Insulin resistance  Hypothyroidism, unspecified type  -     Thyroxine, Free; Future  -     Thyroid Stimulating Hormone; Future  PCOS (polycystic ovarian syndrome)  Fatty liver  -     Comprehensive Metabolic Panel; Future  -     Lipid Panel; Future                 [1]   Allergies  Allergen Reactions    Amoxicillin Hives

## 2025-06-30 NOTE — PATIENT INSTRUCTIONS
Today we addressed your Diabetes and pcos.  You are doing amazing and have lost 30 pounds doing diet and exercise - keep up the great work!! We will decrease your metformin to 500mg twice daily.  Continue your current plan    Today we followed up on your Thyroid medication - we will check your labs and adjust the medication accordingly.  Our goal will be to have a TSH between 2 and 3.  We will closely monitor your symptoms to determine the optimal dosing.      Today we followed up on your anxiety.  You are doing great on your current medication.  Refills Sent.  Consider CBT/therapy to help with other underlying issues if needed.     You have had a history of high triglycerides as well and you have been taking fish oil and eating low carb so we will recheck your lipids today since you are fasting.      Follow up in 4 months for medication check

## 2025-07-01 LAB
ALBUMIN SERPL-MCNC: 4.1 G/DL (ref 3.6–5.1)
ALP SERPL-CCNC: 31 U/L (ref 31–125)
ALT SERPL-CCNC: 20 U/L (ref 6–29)
ANION GAP SERPL CALCULATED.4IONS-SCNC: 10 MMOL/L (CALC) (ref 7–17)
AST SERPL-CCNC: 16 U/L (ref 10–30)
BILIRUB SERPL-MCNC: 0.4 MG/DL (ref 0.2–1.2)
BUN SERPL-MCNC: 18 MG/DL (ref 7–25)
CALCIUM SERPL-MCNC: 9.6 MG/DL (ref 8.6–10.2)
CHLORIDE SERPL-SCNC: 104 MMOL/L (ref 98–110)
CHOLEST SERPL-MCNC: 168 MG/DL
CHOLEST/HDLC SERPL: 2.8 (CALC)
CO2 SERPL-SCNC: 24 MMOL/L (ref 20–32)
CREAT SERPL-MCNC: 0.94 MG/DL (ref 0.5–0.97)
EGFRCR SERPLBLD CKD-EPI 2021: 82 ML/MIN/1.73M2
GLUCOSE SERPL-MCNC: 98 MG/DL (ref 65–99)
HDLC SERPL-MCNC: 59 MG/DL
LDLC SERPL CALC-MCNC: 71 MG/DL (CALC)
NONHDLC SERPL-MCNC: 109 MG/DL (CALC)
POTASSIUM SERPL-SCNC: 5.3 MMOL/L (ref 3.5–5.3)
PROT SERPL-MCNC: 6.9 G/DL (ref 6.1–8.1)
SODIUM SERPL-SCNC: 138 MMOL/L (ref 135–146)
T4 FREE SERPL-MCNC: 1.2 NG/DL (ref 0.8–1.8)
TRIGL SERPL-MCNC: 292 MG/DL
TSH SERPL-ACNC: 0.59 MIU/L

## 2025-07-16 DIAGNOSIS — E03.9 HYPOTHYROIDISM, UNSPECIFIED TYPE: ICD-10-CM

## 2025-07-17 RX ORDER — LEVOTHYROXINE SODIUM 100 UG/1
100 TABLET ORAL DAILY
Qty: 90 TABLET | Refills: 1 | Status: SHIPPED | OUTPATIENT
Start: 2025-07-17

## (undated) DEVICE — GLOVE, SURGICAL, BIOGEL, 6, PF, LATEX, GREEN

## (undated) DEVICE — SOLUTION, IRRIGATION, STERILE WATER, 1000 ML, POUR BOTTLE

## (undated) DEVICE — DRAPE, C-ARM IMAGE

## (undated) DEVICE — TOWEL PACK, STERILE, 4/PACK, BLUE

## (undated) DEVICE — SUTURE, VICRYL, 0, 27 IN, UR-6, VIOLET

## (undated) DEVICE — SUTURE, MONOCRYL, 4-0, 27 IN, PS-2, UNDYED

## (undated) DEVICE — C ARM DRAPE

## (undated) DEVICE — CATHETER, LAPAROSCOPIC, CHOLANGIOGRAPHY

## (undated) DEVICE — TROCAR SYSTEM, BALLOON, KII GELPORT, 12 X 100MM

## (undated) DEVICE — RETRIEVAL SYSTEM, MONARCH, 10MM DISP ENDOSCOPIC

## (undated) DEVICE — TROCAR, OPTICAL, BLADELESS, 12MM, THREADED, 100MM LENGTH

## (undated) DEVICE — TROCAR, KII OPTICAL BLADELESS 5MM Z THREAD 100MM LNGTH

## (undated) DEVICE — SOLUTION, IRRIGATION, SODIUM CHLORIDE 0.9%, 1000 ML, POUR BOTTLE

## (undated) DEVICE — ADHESIVE, SKIN, DERMABOND ADVANCED, 15CM, PEN-STYLE

## (undated) DEVICE — APPLICATOR, CHLORAPREP, W/ORANGE TINT, 26ML

## (undated) DEVICE — Device